# Patient Record
Sex: FEMALE | Race: WHITE | Employment: FULL TIME | URBAN - METROPOLITAN AREA
[De-identification: names, ages, dates, MRNs, and addresses within clinical notes are randomized per-mention and may not be internally consistent; named-entity substitution may affect disease eponyms.]

---

## 2018-07-06 ENCOUNTER — OFFICE VISIT (OUTPATIENT)
Dept: SURGERY | Age: 33
End: 2018-07-06

## 2018-07-06 VITALS
OXYGEN SATURATION: 99 % | HEART RATE: 80 BPM | DIASTOLIC BLOOD PRESSURE: 77 MMHG | BODY MASS INDEX: 40.46 KG/M2 | WEIGHT: 237 LBS | HEIGHT: 64 IN | RESPIRATION RATE: 16 BRPM | SYSTOLIC BLOOD PRESSURE: 136 MMHG | TEMPERATURE: 97.4 F

## 2018-07-06 DIAGNOSIS — E66.01 SEVERE OBESITY (BMI 35.0-39.9): ICD-10-CM

## 2018-07-06 DIAGNOSIS — E66.01 MORBID OBESITY (HCC): Primary | ICD-10-CM

## 2018-07-06 RX ORDER — OXYCODONE AND ACETAMINOPHEN 5; 325 MG/1; MG/1
TABLET ORAL
Status: ON HOLD | COMMUNITY
End: 2018-12-20

## 2018-07-06 RX ORDER — DULOXETIN HYDROCHLORIDE 60 MG/1
60 CAPSULE, DELAYED RELEASE ORAL DAILY
COMMUNITY
End: 2018-12-10 | Stop reason: ALTCHOICE

## 2018-07-06 RX ORDER — ALPRAZOLAM 2 MG/1
TABLET ORAL
COMMUNITY
End: 2018-12-10 | Stop reason: ALTCHOICE

## 2018-07-06 RX ORDER — HYDROXYZINE 25 MG/1
TABLET, FILM COATED ORAL
COMMUNITY
End: 2018-12-10 | Stop reason: ALTCHOICE

## 2018-07-06 RX ORDER — DEXTROAMPHETAMINE SACCHARATE, AMPHETAMINE ASPARTATE, DEXTROAMPHETAMINE SULFATE AND AMPHETAMINE SULFATE 7.5; 7.5; 7.5; 7.5 MG/1; MG/1; MG/1; MG/1
30 TABLET ORAL
COMMUNITY

## 2018-07-06 RX ORDER — HYDROCHLOROTHIAZIDE 12.5 MG/1
12.5 TABLET ORAL
COMMUNITY
End: 2018-12-21

## 2018-07-06 NOTE — PROGRESS NOTES
Bariatric Surgery Consultation    Subjective: The patient is a 27 y.o. obese female with a Body mass index is 40.68 kg/(m^2). Oricaitie Conrad The patient is not at her heaviest weight, she was at her heaviest about 7 months ago, for the past 3 years. she has been overweight since about 4 years ago. she has been considering surgery since 4 months ago. she desires surgery at this time because of multiple health concerns and their lifestyle issues which are hindered by their weight. she has been referred by his family physician Dr Caroline Villa for evaluation and treatment of their obesity via surgical intervention. Miryam Alexander has tried multiple diets in her lifetime most recently tried physician supervised    Bariatric comorbidities present are   Patient Active Problem List   Diagnosis Code    Severe obesity (BMI 35.0-39.9) ([facility] [facility] E66.01    GEORGE (obstructive sleep apnea) G47.33    Back pain M54.9    Edema extremities R60.0    Morbid obesity (HCC) E66.01    BMI 38.0-38.9,adult Z68.38    GERD (gastroesophageal reflux disease) K21.9    ADHD (attention deficit hyperactivity disorder) F90.9    Anxiety F41.9    Morbid obesity with BMI of 40.0-44.9, adult ([facility] [address] E66.01, Z68.41    Hypertension I10       The patient is considering laparoscopic sleeve gastrectomy for surgical weight loss due to their ineffective progress with medical forms of weight loss and the urging of their physician who cares for their primary medical issues. The patient  now presents  for consideration for weight loss surgery understanding the benefits of this over a medical approach of weight loss as was discussed in our presentation on weight loss surgery. They have discussed their plans both with their family and primary care physician who is in support of their pursuit of such. The patient has had health issues as of late and denies and gastrointestinal disturbances other than what is outlined below in their review of symptoms.  All of their prior evaluations available by both their PCP's and specialists physicians have been reviewed today either in the Care Everywhere portal or scanned under the media tab. I have spent a large portion of my initial consultation today reviewing the patients current dietary habits which have contributed to their health issues and obesity. I have suggested to them personally a dietary regimen that they can initiate now to help with their status as it pertains to their weight. They understand that the most important aspect of their journey through their weight loss endeavor will be their adherence to a new lifestyle of healthy eating behavior. They also understand that an adherence to an exercise program will not only help with weight loss but is ultimately important in weight maintenance. The patients goal weight is 170lb. These goals are consistent with expected outcomes of their desired operation. her Medical goals are resolution of these health issues. Patient Active Problem List    Diagnosis Date Noted    Severe obesity (BMI 35.0-39.9) ([facility] [address] 07/06/2018    GEORGE (obstructive sleep apnea)     Back pain     Edema extremities     Morbid obesity ([address]     BMI 38.0-38.9,adult     GERD (gastroesophageal reflux disease)     ADHD (attention deficit hyperactivity disorder)     Anxiety     Morbid obesity with BMI of 40.0-44.9, adult ([address]     Hypertension      Past Surgical History:   Procedure Laterality Date    HX WISDOM TEETH EXTRACTION        Social History   Substance Use Topics    Smoking status: Never Smoker    Smokeless tobacco: Never Used    Alcohol use No      Family History   Problem Relation Age of Onset    Stroke Mother     COPD Mother    [de-identified] Arthritis-rheumatoid Mother     Alcohol abuse Father     Depression Father     Drug Abuse Father     Diabetes Sister       Current Outpatient Prescriptions   Medication Sig Dispense Refill    ALPRAZolam (XANAX) 2 mg tablet Take  by mouth.       oxyCODONE-acetaminophen (PERCOCET) 5-325 mg per tablet Take  by mouth every four (4) hours as needed for Pain.  dextroamphetamine-amphetamine (ADDERALL) 30 mg tablet Take 30 mg by mouth.  hydroCHLOROthiazide (HYDRODIURIL) 12.5 mg tablet Take 12.5 mg by mouth daily.  DULoxetine (CYMBALTA) 60 mg capsule Take 60 mg by mouth daily.  hydrOXYzine HCl (ATARAX) 25 mg tablet Take  by mouth three (3) times daily as needed for Itching. No Known Allergies       Review of Systems:       General - No history or complaints of unexpected fever, chills, or weight loss  Head/Neck - No history or complaints of headache, diplopia, dysphagia, hearing loss  Cardiac - History of palpitations and shortness of breath. No history or complaints of chest pain, or murmur  Pulmonary - No history or complaints of shortness of breath, productive cough, hemoptysis  Gastrointestinal - History of acid reflux, no abdominal pain, obstipation/constipation or blood per rectum  Genitourinary - No history or complaints of hematuria/dysuria, stress urinary incontinence symptoms, or renal lithiasis  Musculoskeletal - History of joint pain in their back, right hip and right knee, with muscular weakness on her right side  Hematologic - No history or complaints of bleeding disorders, No blood transfusions  Neurologic - No history or complaints of  migraine headaches, seizure activity, syncopal episodes, TIA or stroke  Integumentary - No history or complaints of rashes, abnormal nevi, skin cancer  Gynecological - History of multiple still births.   No history of dysmenorrhea or cervical cancer               Objective:     Visit Vitals    /77 (BP 1 Location: Left arm, BP Patient Position: Sitting)    Pulse 80    Temp 97.4 °F (36.3 °C)    Resp 16    Ht 5' 4\" (1.626 m)    Wt 107.5 kg (237 lb)    SpO2 99%    BMI 40.68 kg/m2       Physical Examination: General appearance - alert, well appearing, and in no distress and oriented to person, place, and time  Mental status - alert, oriented to person, place, and time, normal mood, behavior, speech, dress, motor activity, and thought processes  Eyes - pupils equal and reactive, extraocular eye movements intact, sclera anicteric, left eye normal, right eye normal  Ears - right ear normal, left ear normal  Nose - normal and patent, no erythema, discharge or polyps  Mouth - mucous membranes moist, pharynx normal without lesions  Neck - supple, no significant adenopathy  Lymphatics - no palpable lymphadenopathy, no hepatosplenomegaly  Chest - clear to auscultation, no wheezes, rales or rhonchi, symmetric air entry  Heart - normal rate, regular rhythm, normal S1, S2, no murmurs, rubs, clicks or gallops  Abdomen - soft, nontender, nondistended, no masses or organomegaly  Back exam - full range of motion, no tenderness, palpable spasm or pain on motion  Neurological - alert, oriented, normal speech, no focal findings or movement disorder noted  Musculoskeletal - no joint tenderness, deformity or swelling  Extremities - peripheral pulses normal, no pedal edema, no clubbing or cyanosis  Skin - normal coloration and turgor, no rashes, no suspicious skin lesions noted    Labs:       No results found for this or any previous visit (from the past 1440 hour(s)). Assessment:     Morbid obesity with comorbidity    Plan:     laparoscopic sleeve gastrectomy    This is a 27 y.o. female with a BMI of Body mass index is 40.68 kg/(m^2). and the weight-related co-morbidties as noted below. Pillo Lovelace meets the NIH criteria for bariatric surgery based upon the BMI of Body mass index is 40.68 kg/(m^2). and multiple weight-related co-morbidties. Pillo Lovelace has elected laparoscopic sleeve gastrectomy as her intervention of choice for treatment of morbid obestiy through surgical means secondary to its safety profile, rapid return to work  and decreases in operative risks over gastric bypass.     In the office today, following Romina's history and physical examination, a 30 minute discussion regarding the anatomic alterations for the laparoscopic sleeve gastrectomy was undertaken. The dietary expectations and the patient and physician dependent factors for success were thoroughly discussed, to include the need for interval follow-up and long-term dietary changes associated with success. The possible complications of the sleeve gastrectomy  were also discussed, to include;death, DVT/PE, staple line leak, bleeding, stricture formation, infection, nutritional deficiencies and sleeve dilation. Specific weight related outcomes for success were also discussed with an emphasis on careful and close follow-up with the first year and eating behavior modification as the baseline and cyclical hunger return. The patient expressed an understanding of the above factors, and her questions were answered in their entirety. In addition, the patient attended a 1.5 hour power point seminar regarding obesity, surgical weight loss including, adjustable gastric band, gastric bypass, and sleeve gastrectomy. This discussion contrasted the different surgical techniques, mechanisms of actions and expected outcomes, and surgical and medical risks associated with each procedure. During this seminar, there was a long question and answer session where each questions was answered until there were no additional questions. Today, the patient had all of her questions answered and desires to proceed with  bariatric surgery initially choosing sleeve gastrectomy as her surgical option. Secondary Diagnoses:     Obstructive Sleep Apnea -The patient understands the association of sleep apnea and obesity and the additional risk that it caries related to post surgical complications. We will have the patient bring their CPAP machine to the hospital for use both postoperatively in the PACU and on the floor at its appropriate setting.  We will have them continue using it while at home after surgery and follow up with their pulmonologist 6 months after to be retested to see if it can be discontinued at that time period. Dietary Intervention  - The patient is currently scheduled to see or has been followed by a bariatric nutritionist for an attempt at preoperative weight loss as has been dictated by their insurance carrier. They will be assessed at various times during their follow up to evaluate their progress depending on the length of time that is required once again by their carrier. I have explained the importance of preoperative weight loss and the benefits regarding lower surgical risk and also assisting the patient in reaching their weight loss goal.  Finally they understand their is a physiologic benefit from the standpoint of hepatic volume reduction preoperatively. I have reiterated the importance of a low carbohydrate and high protein regimen to achieve their stated goal.    Hypertension - The patient has a clear understanding of how weight loss improves hypertension as a whole, but also they understand that there is a significant genetic component to this disease process. We will monitor the patients blood pressure while in the hospital and the plan would be to continue those medications postoperatively.  If a diuretic is being used we will stop them on discharge to prevent dehydration particularly with the sleeve gastrectomy and the gastric bypass procedures.  They will be instructed to monitor their blood pressure postoperatively while at home and notify their primary care physician in the event of any significantly high or uncharacteristic readings.     GERD -The patient understands that weight loss surgery is not a guaranteed cure for reflux disease but does understand the benefits that weight loss can have on reflux disease.  They also understand that at the time of surgery the gastroesophageal junction will be evaluated for the presence of a diaphragmatic hernia.  Hernias will be corrected always with the gastric band and sleeve gastrectomy procedures, but only on a case by case basis with the gastric bypass if it prevents our ability to perform the operation at hand, or if I feel that they would benefit long term with correction of this issue.  The patient also understands that neither weight loss surgery nor repair of a diaphragmatic hernia repair guarantees the complete cessation of the disease. They also understand there is a possibility of recurrence with a simple crural repair as is performed with these procedures. They understand they may have to continue their medications in the postoperative period. They have a good understanding that the gastric bypass procedure is better suited to total resolution of this issue and that neither the Lap Band nor sleeve gastrectomy is considered a curative procedure as it pertains to this diagnosis.             Signed By: Radha Armenta MD     July 6, 2018

## 2018-07-06 NOTE — MR AVS SNAPSHOT
Jose E Godoy 
 
 
 [address] [facility] 
[phone] Patient: Yaneth Hernandez MRN: [id number] :1985 Visit Information Date & Time Provider Department Dept. Phone Encounter #  
 2018 12:00 PM Elfida Buerger, [address] [facility] [phone] [id number] Follow-up Instructions Return in about 1 month (around 2018). Follow-up and Disposition History Your Appointments 2018 11:00 AM  
NUTRITION COUNSELING with TSS NUTRI VISIT PEN [facility] ([address]) Appt Note:   
 [address] [address] [facility]  
  
   
 [address] [place] [address] Upcoming Health Maintenance Date Due DTaP/Tdap/Td series (1 - Tdap) 2004 PAP AKA CERVICAL CYTOLOGY 2004 Influenza Age 5 to Adult 2018 Allergies as of 2018  Review Complete On: 2018 By: Elfida Buerger, MD  
 No Known Allergies Current Immunizations  Never Reviewed No immunizations on file. Not reviewed this visit You Were Diagnosed With   
  
 Codes Comments Morbid obesity ([facility] [address] [facility]    -  Primary ICD-10-CM: E66.01 
ICD-9-CM: 278.01 Severe obesity (BMI 35.0-39.9) ([facility])     ICD-10-CM: E66.01 
ICD-9-CM: 278.01 Vitals BP Pulse Temp Resp Height(growth percentile) Weight(growth percentile) 136/77 (BP 1 Location: Left arm, BP Patient Position: Sitting) 80 97.4 °F (36.3 °C) 16 5' 4\" (1.626 m) 237 lb (107.5 kg) SpO2 BMI Smoking Status 99% 40.68 kg/m2 Never Smoker Vitals History BMI and BSA Data Body Mass Index Body Surface Area  
 40.68 kg/m 2 2.2 m 2 Your Updated Medication List  
  
   
This list is accurate as of 18  1:15 PM.  Always use your most recent med list.  
  
  
  
  
 ADDERALL 30 mg tablet Generic drug:  dextroamphetamine-amphetamine Take 30 mg by mouth. DULoxetine 60 mg capsule Commonly known as:  CYMBALTA Take 60 mg by mouth daily. hydroCHLOROthiazide 12.5 mg tablet Commonly known as:  HYDRODIURIL Take 12.5 mg by mouth daily. hydrOXYzine HCl 25 mg tablet Commonly known as:  ATARAX Take  by mouth three (3) times daily as needed for Itching. PERCOCET 5-325 mg per tablet Generic drug:  oxyCODONE-acetaminophen Take  by mouth every four (4) hours as needed for Pain. XANAX 2 mg tablet Generic drug:  ALPRAZolam  
Take  by mouth. Follow-up Instructions Return in about 1 month (around 8/6/2018). Patient Instructions Body Mass Index: Care Instructions Your Care Instructions Body mass index (BMI) can help you see if your weight is raising your risk for health problems. It uses a formula to compare how much you weigh with how tall you are. · A BMI lower than 18.5 is considered underweight. · A BMI between 18.5 and 24.9 is considered healthy. · A BMI between 25 and 29.9 is considered overweight. A BMI of 30 or higher is considered obese. If your BMI is in the normal range, it means that you have a lower risk for weight-related health problems. If your BMI is in the overweight or obese range, you may be at increased risk for weight-related health problems, such as high blood pressure, heart disease, stroke, arthritis or joint pain, and diabetes. If your BMI is in the underweight range, you may be at increased risk for health problems such as fatigue, lower protection (immunity) against illness, muscle loss, bone loss, hair loss, and hormone problems. BMI is just one measure of your risk for weight-related health problems. You may be at higher risk for health problems if you are not active, you eat an unhealthy diet, or you drink too much alcohol or use tobacco products. Follow-up care is a key part of your treatment and safety. Be sure to make and go to all appointments, and call your doctor if you are having problems. It's also a good idea to know your test results and keep a list of the medicines you take. How can you care for yourself at home? · Practice healthy eating habits. This includes eating plenty of fruits, vegetables, whole grains, lean protein, and low-fat dairy. · If your doctor recommends it, get more exercise. Walking is a good choice. Bit by bit, increase the amount you walk every day. Try for at least 30 minutes on most days of the week. · Do not smoke. Smoking can increase your risk for health problems. If you need help quitting, talk to your doctor about stop-smoking programs and medicines. These can increase your chances of quitting for good. · Limit alcohol to 2 drinks a day for men and 1 drink a day for women. Too much alcohol can cause health problems. If you have a BMI higher than 25 · Your doctor may do other tests to check your risk for weight-related health problems. This may include measuring the distance around your waist. A waist measurement of more than 40 inches in men or 35 inches in women can increase the risk of weight-related health problems. · Talk with your doctor about steps you can take to stay healthy or improve your health. You may need to make lifestyle changes to lose weight and stay healthy, such as changing your diet and getting regular exercise. If you have a BMI lower than 18.5 · Your doctor may do other tests to check your risk for health problems. · Talk with your doctor about steps you can take to stay healthy or improve your health. You may need to make lifestyle changes to gain or maintain weight and stay healthy, such as getting more healthy foods in your diet and doing exercises to build muscle. Where can you learn more? Go to http://desire-homar.info/. Enter S176 in the search box to learn more about \"Body Mass Index: Care Instructions. \" Current as of: October 13, 2016 Content Version: 11.4 © 7893-3566 [employer]. Care instructions adapted under license by [employer] (which disclaims liability or warranty for this information). If you have questions about a medical condition or this instruction, always ask your healthcare professional. [facility] any warranty or liability for your use of this information. New patient Instructions 1. Ensure all pre-operative insurances requirements are complete (ie; dietary visits, psychology consults, primary care documentation, etc) 2. Adhere to pre-operative weight loss / weight maintenance plan discussed in the office today. 3. Contact the office with any questions on pre-operative clearance issues (ie; cardiology work-up, pulmonary work-up, upper GI study, etc). 4. If a barium upper GI study has been ordered for your evaluation, make sure you are on liquids only the morning of the procedure. Patient Instructions History Introducing [facility]! [employer] introduces [employer] patient portal. Now you can access parts of your medical record, email your doctor's office, and request medication refills online. 1. In your internet browser, go to [email or web address] 2. Click on the First Time User? Click Here link in the Sign In box. You will see the New Member Sign Up page. 3. Enter your [employer] Access Code exactly as it appears below. You will not need to use this code after youve completed the sign-up process. If you do not sign up before the expiration date, you must request a new code. · [employer] Access Code: [email or web address] Expires: 10/4/2018  1:15 PM 
 
4. Enter the last four digits of your Social Security Number (xxxx) and Date of Birth (mm/dd/yyyy) as indicated and click Submit.  You will be taken to the next sign-up page. 5. Create a [employer] ID. This will be your [employer] login ID and cannot be changed, so think of one that is secure and easy to remember. 6. Create a [employer] password. You can change your password at any time. 7. Enter your Password Reset Question and Answer. This can be used at a later time if you forget your password. 8. Enter your e-mail address. You will receive e-mail notification when new information is available in [address] 9. Click Sign Up. You can now view and download portions of your medical record. 10. Click the Download Summary menu link to download a portable copy of your medical information. If you have questions, please visit the Frequently Asked Questions section of the [employer] website. Remember, [employer] is NOT to be used for urgent needs. For medical emergencies, dial 911. Now available from your iPhone and Android! Please provide this summary of care documentation to your next provider. Your primary care clinician is listed as NONE. If you have any questions after today's visit, please call [phone].

## 2018-07-06 NOTE — PATIENT INSTRUCTIONS
Body Mass Index: Care Instructions  Your Care Instructions    Body mass index (BMI) can help you see if your weight is raising your risk for health problems. It uses a formula to compare how much you weigh with how tall you are. · A BMI lower than 18.5 is considered underweight. · A BMI between 18.5 and 24.9 is considered healthy. · A BMI between 25 and 29.9 is considered overweight. A BMI of 30 or higher is considered obese. If your BMI is in the normal range, it means that you have a lower risk for weight-related health problems. If your BMI is in the overweight or obese range, you may be at increased risk for weight-related health problems, such as high blood pressure, heart disease, stroke, arthritis or joint pain, and diabetes. If your BMI is in the underweight range, you may be at increased risk for health problems such as fatigue, lower protection (immunity) against illness, muscle loss, bone loss, hair loss, and hormone problems. BMI is just one measure of your risk for weight-related health problems. You may be at higher risk for health problems if you are not active, you eat an unhealthy diet, or you drink too much alcohol or use tobacco products. Follow-up care is a key part of your treatment and safety. Be sure to make and go to all appointments, and call your doctor if you are having problems. It's also a good idea to know your test results and keep a list of the medicines you take. How can you care for yourself at home? · Practice healthy eating habits. This includes eating plenty of fruits, vegetables, whole grains, lean protein, and low-fat dairy. · If your doctor recommends it, get more exercise. Walking is a good choice. Bit by bit, increase the amount you walk every day. Try for at least 30 minutes on most days of the week. · Do not smoke. Smoking can increase your risk for health problems. If you need help quitting, talk to your doctor about stop-smoking programs and medicines. These can increase your chances of quitting for good. · Limit alcohol to 2 drinks a day for men and 1 drink a day for women. Too much alcohol can cause health problems. If you have a BMI higher than 25  · Your doctor may do other tests to check your risk for weight-related health problems. This may include measuring the distance around your waist. A waist measurement of more than 40 inches in men or 35 inches in women can increase the risk of weight-related health problems. · Talk with your doctor about steps you can take to stay healthy or improve your health. You may need to make lifestyle changes to lose weight and stay healthy, such as changing your diet and getting regular exercise. If you have a BMI lower than 18.5  · Your doctor may do other tests to check your risk for health problems. · Talk with your doctor about steps you can take to stay healthy or improve your health. You may need to make lifestyle changes to gain or maintain weight and stay healthy, such as getting more healthy foods in your diet and doing exercises to build muscle. Where can you learn more? Go to http://desire-homar.info/. Enter S176 in the search box to learn more about \"Body Mass Index: Care Instructions. \"  Current as of: October 13, 2016  Content Version: 11.4  © 1366-9971 Health[employer], Inc[employer]. Care instructions adapted under license by [employer] (which disclaims liability or warranty for this information). If you have questions about a medical condition or this instruction, always ask your healthcare professional. [place] [address] any warranty or liability for your use of this information. New patient Instructions      1. Ensure all pre-operative insurances requirements are complete (ie; dietary visits, psychology consults, primary care documentation, etc)    2. Adhere to pre-operative weight loss / weight maintenance plan discussed in the office today.     3. Contact the office with any questions on pre-operative clearance issues (ie; cardiology work-up, pulmonary work-up, upper GI study, etc). 4. If a barium upper GI study has been ordered for your evaluation, make sure you are on liquids only the morning of the procedure.

## 2018-07-12 ENCOUNTER — CLINICAL SUPPORT (OUTPATIENT)
Dept: SURGERY | Age: 33
End: 2018-07-12

## 2018-07-12 VITALS — WEIGHT: 232 LBS | BODY MASS INDEX: 39.61 KG/M2 | HEIGHT: 64 IN

## 2018-07-12 DIAGNOSIS — E66.01 SEVERE OBESITY (BMI 35.0-39.9): Primary | ICD-10-CM

## 2018-07-12 NOTE — PROGRESS NOTES
Medical Weight Loss Multi-Disciplinary Program    Name: Ana Gonzalez   : 1985    Session# 1  Date: 2018     Height: 5' 4\" (162.6 cm)    Weight: 105.2 kg (232 lb) lbs. Body mass index is 39.82 kg/(m^2). Dietary Instructions    Reviewed intake  Understanding low carbohydrates, low sugar, higher protein meals  Understanding proper portions  Instruction given for personal dietary changes  Discussed perceived compliance  Comments: Pt given brief pre/post-op diet ed and diet hx reviewed. Physical Activity/Exercise    Discussed Perceived Compliance  Reasonable Goals Set  Motivation  Comments: Pt walks as tolerated. She has physician limitations due to severe back condition. Behavior Modification    Positive attitude  Comments: Pt is working on the following goals:    1. Continue to work to increase walking as tolerated and as physician recommended. 2. Eat 3 meals per day- can use a protein drink for one meal per day. 100 grams of carbohydrate guide. 3. Can continue intermittent fasting if would like. 4. Start taking a complete multivitamin daily. Candidate for surgery (per RD): pending    Dietitian: Dipti Armendariz, RD     Ana Gonzalez is a 27 y.o. female who present for a pre-op evaluation. Visit Vitals    Ht 5' 4\" (1.626 m)    Wt 105.2 kg (232 lb)    BMI 39.82 kg/m2     Past Medical History:   Diagnosis Date    ADHD (attention deficit hyperactivity disorder)     Anxiety     Back pain     Edema extremities     GERD (gastroesophageal reflux disease)     Hypertension     Morbid obesity ([address]     Morbid obesity with BMI of 40.0-44.9, adult ([address]     GEORGE (obstructive sleep apnea)            Procedure:  laparoscopic sleeve gastrectomy     Reasons for Surgery:  BMI > 35 with one or more medically significant comorbidities    Summary:  Pt given brief pre/post-op diet ed and diet hx reviewed.  Pt instructed to follow a low calorie, low carbohydrate, high protein diet of about 7706-4376 calories daily. Pt set several goals. See below. Current Vitamins: none    What have you done in the past to try to lose weight? Phentermine prescription, exercising lost 62 lbs, calorie and carb intake, decreased sodium, intermittent fasting      Why didn't you lose weight or keep the weight off?: back issues (spine is like a 67year old- they want to do fusion surgery but she does not want this)- cannot sit or stand for 15 minutes time periods      Patient Education and Materials Provided:  Supplement [facility], B Vitamin Information, MVI Recommendations, Calcium Citrate Information, Bariatric Supplement Companies, Protein Supplement Information, Fluid Requirements, No Caffeine or Carbonation, No Alcohol for One Year Post Op, 3 Balanced Meals a Day, Food Group Guide, Exercising and Addressed Current Habits / Changes to make    Nutritional Hx: What is the number of meals you eat per day? 2    Do you eat between meals / snack? yes  Typical snack: protein packs- P3 packs or make self     How fast do you eat your meals? rapid    How often do you eat fast food? Maybe once if that     How many sodas/sugared beverages do you drink per day? A regular [employer] [facility] can maybe once per week     How many caffeinated drinks do you have per day? [facility] [employer] [place]    How much milk and/or juice do you drink per day? Juice sometimes    How much water do you drink per day? 10+ (8oz glasses)    How often do you consume alcohol? never;     Diet History:  Breakfast  What are you eating and how much? Nutrigrain bar- almond nut regular    When? 10:30 am (because doing intermittent fasting)    Where? iii   Snacks  What are you eating and how much? i   When? ii   Where? iii   Hydration  What are you eating and how much? Water throughout the day    When? ii   Where? ii   Lunch  What are you eating and how much? P3- lunch    When? 12:45 PM   Where? iii   Snacks  What are you eating and how much? When?     Where? iii Hydration  What are you eating and how much? Water throughout the afternoon    When? ii   Where? iii   Dinner  What are you eating and how much? [employer]- chicken tenders, mashed potatoes and gravy    When? 7:15 pm   Where? iii   Snacks  What are you eating and how much? None last night- but may have lunch meat with cheese and a pickle or may have some ice cream- typically no snacking    When? ii   Where? iii   Hydration  What are you eating and how much? water   When? ii   Where? iii     Exercise:  Do you currently have an exercise routine? no, can walk as tolerated     Goals:   1. Continue to work to increase walking as tolerated and as physician recommended. 2. Eat 3 meals per day- can use a protein drink for one meal per day. 100 grams of carbohydrate guide. 3. Can continue intermittent fasting if would like. 4. Start taking a complete multivitamin daily.

## 2018-07-12 NOTE — PATIENT INSTRUCTIONS
Goals: 1. Continue to work to increase walking as tolerated and as physician recommended. 2. Eat 3 meals per day- can use a protein drink for one meal per day. 100 grams of carbohydrate guide. 3. Can continue intermittent fasting if would like. 4. Start taking a complete multivitamin daily.

## 2018-07-12 NOTE — MR AVS SNAPSHOT
[facility] 
 
 
 [facility] [place] [address] 
[phone] Patient: Theodore Sue MRN: [id number] :1985 Visit Information Date & Time Provider Department Dept. Phone Encounter #  
 2018  8:00 AM TSS [facility] [place] [phone] [id number] Your Appointments 2018  1:00 PM  
NUTRITION COUNSELING with TSS NUTR VISIT2 [employer] [facility] [place] [facility] Appt Note:   
 [id number] [facility] [place] [address]  
  
   
 [address] [place] [address] Upcoming Health Maintenance Date Due DTaP/Tdap/Td series (1 - Tdap) 2004 PAP AKA CERVICAL CYTOLOGY 2004 Influenza Age 5 to Adult 2018 Allergies as of 2018  Review Complete On: 2018 By: Anya Chung MD  
 No Known Allergies Current Immunizations  Never Reviewed No immunizations on file. Not reviewed this visit Vitals Height(growth percentile) Weight(growth percentile) BMI Smoking Status 5' 4\" (1.626 m) 232 lb (105.2 kg) 39.82 kg/m2 Never Smoker BMI and BSA Data Body Mass Index Body Surface Area  
 39.82 kg/m 2 2.18 m 2 Your Updated Medication List  
  
   
This list is accurate as of 18  8:23 AM.  Always use your most recent med list.  
  
  
  
  
 ADDERALL 30 mg tablet Generic drug:  dextroamphetamine-amphetamine Take 30 mg by mouth. DULoxetine 60 mg capsule Commonly known as:  CYMBALTA Take 60 mg by mouth daily. hydroCHLOROthiazide 12.5 mg tablet Commonly known as:  HYDRODIURIL Take 12.5 mg by mouth daily. hydrOXYzine HCl 25 mg tablet Commonly known as:  ATARAX Take  by mouth three (3) times daily as needed for Itching. PERCOCET 5-325 mg per tablet Generic drug:  oxyCODONE-acetaminophen Take  by mouth every four (4) hours as needed for Pain. XANAX 2 mg tablet Generic drug:  ALPRAZolam  
Take  by mouth. Patient Instructions Goals: 1. Continue to work to increase walking as tolerated and as physician recommended. 2. Eat 3 meals per day- can use a protein drink for one meal per day. 100 grams of carbohydrate guide. 3. Can continue intermittent fasting if would like. 4. Start taking a complete multivitamin daily. Introducing [facility] SERVICES! Ryanne Lund introduces [employer] patient portal. Now you can access parts of your medical record, email your doctor's office, and request medication refills online. 1. In your internet browser, go to [email or web address] 2. Click on the First Time User? Click Here link in the Sign In box. You will see the New Member Sign Up page. 3. Enter your [employer] Access Code exactly as it appears below. You will not need to use this code after youve completed the sign-up process. If you do not sign up before the expiration date, you must request a new code. · [employer] Access Code: [email or web address] Expires: 10/4/2018  1:15 PM 
 
4. Enter the last four digits of your Social Security Number (xxxx) and Date of Birth (mm/dd/yyyy) as indicated and click Submit. You will be taken to the next sign-up page. 5. Create a [employer] ID. This will be your [employer] login ID and cannot be changed, so think of one that is secure and easy to remember. 6. Create a [employer] password. You can change your password at any time. 7. Enter your Password Reset Question and Answer. This can be used at a later time if you forget your password. 8. Enter your e-mail address. You will receive e-mail notification when new information is available in [address] 9. Click Sign Up. You can now view and download portions of your medical record. 10. Click the Download Summary menu link to download a portable copy of your medical information. If you have questions, please visit the Frequently Asked Questions section of the [employer] website. Remember, [employer] is NOT to be used for urgent needs. For medical emergencies, dial 911. Now available from your iPhone and Android! Please provide this summary of care documentation to your next provider. Your primary care clinician is listed as NONE. If you have any questions after today's visit, please call [phone].

## 2018-08-08 ENCOUNTER — HOSPITAL ENCOUNTER (OUTPATIENT)
Age: 33
Setting detail: OUTPATIENT SURGERY
Discharge: HOME | End: 2018-08-08
Attending: SPECIALIST | Admitting: SPECIALIST
Payer: MEDICAID

## 2018-08-08 ENCOUNTER — CLINICAL SUPPORT (OUTPATIENT)
Dept: SURGERY | Age: 33
End: 2018-08-08

## 2018-08-08 ENCOUNTER — APPOINTMENT (OUTPATIENT)
Dept: GENERAL RADIOLOGY | Age: 33
End: 2018-08-08
Attending: SPECIALIST
Payer: MEDICAID

## 2018-08-08 VITALS
HEART RATE: 84 BPM | TEMPERATURE: 98.7 F | SYSTOLIC BLOOD PRESSURE: 126 MMHG | OXYGEN SATURATION: 97 % | RESPIRATION RATE: 14 BRPM | WEIGHT: 230.6 LBS | BODY MASS INDEX: 39.58 KG/M2 | DIASTOLIC BLOOD PRESSURE: 80 MMHG

## 2018-08-08 VITALS — WEIGHT: 232 LBS | BODY MASS INDEX: 39.61 KG/M2 | HEIGHT: 64 IN

## 2018-08-08 DIAGNOSIS — E66.01 MORBID OBESITY (HCC): ICD-10-CM

## 2018-08-08 DIAGNOSIS — E66.01 SEVERE OBESITY (BMI 35.0-39.9): Primary | ICD-10-CM

## 2018-08-08 PROCEDURE — 74240 X-RAY XM UPR GI TRC 1CNTRST: CPT

## 2018-08-08 PROCEDURE — 74011000255 HC RX REV CODE- 255: Performed by: SPECIALIST

## 2018-08-08 PROCEDURE — 76040000019: Performed by: SPECIALIST

## 2018-08-08 PROCEDURE — 77030032490 HC SLV COMPR SCD KNE COVD -B: Performed by: SPECIALIST

## 2018-08-08 NOTE — PATIENT INSTRUCTIONS
Goals: 1. For protein shake you can try: Isolate Zero, Isopure or you can purchase an unflavored protein powder and mix it with crystal light (recommend mixing it in a protein shaker bottle)  2. Continue current exercise routine of walking daily 2-3 times a day for 15-20 minutes   3.  Continue keeping carbohydrates to 100 grams or less per day

## 2018-08-08 NOTE — PROGRESS NOTES
Medical Weight Loss Multi-Disciplinary Program    Name: Shama Lopez   : 1985    Session# 2  Date: 2018     Height: 5' 4\" (162.6 cm)    Weight: 105.2 kg (232 lb) lbs. Body mass index is 39.82 kg/(m^2). Pounds Lost: 0 Pounds Gained: 0    Dietary Instructions    Reviewed intake  Instruction given for personal dietary changes  Discussed perceived compliance  Comments: reviewed patient's past monthly diet hx. Patient has been working on consistently eating three meals a day - tried nectar strawberry kiwi and did not like it - patient will try another brand of protein shake as a meal replacement or snack. Patient states she is trying to really make sure she's eating three meals a day - specifically breakfast.  Patient also has +4 piting edema currently. Physical Activity/Exercise    Reviewed Activity Log  Discussed Perceived Compliance  Reasonable Goals Set  Motivation  Comments: patient walks daily for 15 minutes at a time - walks her property 2-3 times a day for 15-20 minutes at a time. Behavior Modification    Reviewed behavior modification log  Identify obstacles to trigger change  Achieving/Rewarding goals met  Positive attitude  Discussed perceived compliance  Comments:     Goals:  1. For protein shake you can try: Isolate Zero, Isopure or you can purchase an unflavored protein powder and mix it with crystal light (recommend mixing it in a protein shaker bottle)  2. Continue current exercise routine of walking daily 2-3 times a day for 15-20 minutes   3.  Continue keeping carbohydrates to 100 grams or less per day     Candidate for surgery (per RD):pending   Dietitian: Janette Kwok

## 2018-08-08 NOTE — IP AVS SNAPSHOT
Summary of Care Report The Summary of Care report has been created to help improve care coordination. Users with access to [employer] or [address] (Web-based application) may access additional patient information including the Discharge Summary. If you are not currently a [facility] [address] user and need more information, please call the number listed below in the [place] [address] section and ask to be connected with Medical Records. Facility Information Name Address Phone [address] [address] [phone] Patient Information Patient Name Sex RADHIKA Underwood ([id number]) Female 1985 Discharge Information Admitting Provider Service Area Unit Vandana Bhatia MD /  [facility] Endoscopy / [phone] Discharge Provider Discharge Date/Time Discharge Disposition Destination (none) (none) (none) (none) Patient Language Language ENGLISH [13] Hospital Problems as of 2018  Reviewed: 2018 12:59 PM by Vandana Bhatia MD  
 None Non-Hospital Problems as of 2018  Reviewed: 2018 12:59 PM by Vandana Bhatia MD  
  
  
  
 Class Noted - Resolved Last Modified Active Problems Severe obesity (BMI 35.0-39.9) ([address]  2018 - Present 2018 by NOEMY Paredes Entered by NOEMY Paredes  
  GEORGE (obstructive sleep apnea)  Unknown - Present 2018 by NOEMY Paredes Entered by NOEMY Paredes Back pain  Unknown - Present 2018 by NOEMY Paredes Entered by NOEMY Paredes Edema extremities  Unknown - Present 2018 by NOEMY Paredes Entered by NOEMY Paredes Morbid obesity ([address]  Unknown - Present 2018 by NOEMY Paredes   Entered by NOEMY Paredes  
 BMI 38.0-38.9,adult  Unknown - Present 7/6/2018 by NOEMY Guerra Entered by NOEMY Guerra  
  GERD (gastroesophageal reflux disease)  Unknown - Present 7/6/2018 by NOEMY Guerra Entered by NOEMY Guerra  
  ADHD (attention deficit hyperactivity disorder)  Unknown - Present 7/6/2018 by NOEMY Guerra Entered by NOEMY Guerra Anxiety  Unknown - Present 7/6/2018 by NOEMY Guerra Entered by NOEMY Guerra Morbid obesity with BMI of 40.0-44.9, adult [facility])  Unknown - Present 7/6/2018 by Almedia Gosselin, MD  
  Entered by Almedia Gosselin, MD  
  Hypertension  Unknown - Present 7/6/2018 by Almedia Gosselin, MD  
  Entered by Almedia Gosselin, MD  
  
You are allergic to the following No active allergies Current Discharge Medication List  
  
ASK your doctor about these medications Dose & Instructions Dispensing Information Comments ADDERALL 30 mg tablet Generic drug:  dextroamphetamine-amphetamine Dose:  30 mg Take 30 mg by mouth. Refills:  0 DULoxetine 60 mg capsule Commonly known as:  CYMBALTA Dose:  60 mg Take 60 mg by mouth daily. Refills:  0  
   
 hydroCHLOROthiazide 12.5 mg tablet Commonly known as:  HYDRODIURIL Dose:  12.5 mg Take 12.5 mg by mouth daily. Refills:  0  
   
 hydrOXYzine HCl 25 mg tablet Commonly known as:  ATARAX Take  by mouth three (3) times daily as needed for Itching. Refills:  0 PERCOCET 5-325 mg per tablet Generic drug:  oxyCODONE-acetaminophen Take  by mouth every four (4) hours as needed for Pain. Refills:  0  
   
 XANAX 2 mg tablet Generic drug:  ALPRAZolam  
 Take  by mouth. Refills:  0 Surgery Information ID Date/Time Status Primary Surgeon All Procedures Location [id number] 8/8/2018 1445 Unposted Almedia Gosselin, MD UGI ONLY WILL DO IN FILL CLINIC  [facility] ENDOSCOPY Follow-up Information None Discharge Instructions None Chart Review Routing History No Routing History on File

## 2018-08-08 NOTE — PROCEDURES
Patricia Ortega   : 1985  Medical Record [id number]            PREPROCEDURE DIAGNOSIS: This patient is preoperative for laparoscopic sleeve gastrectomyprocedure with a history of  reflux disease. POSTPROCEDURE DIAGNOSIS: This patient is preoperative for laparoscopic sleeve gastrectomyprocedure with a history of  reflux disease. PROCEDURES PERFORMED: Upper GI study with barium. ESTIMATED BLOOD LOSS: None. SPECIMENS: None. STATEMENT OF MEDICAL NECESSITY: The patient is a patient with a  longstanding history of obesity. They are now considering the laparoscopic sleeve gastrectomyprocedure as a means of surgical weight control and due to their history of reflux disease and are being assessed preoperatively for such. DESCRIPTION OF PROCEDURE: The patient was brought to the fluoroscopy unit and  was given thin barium. On swallowing of barium, they were noted to have  normal peristalsis of their esophagus. They had prompt filling of distal  esophagus with tapering into the gastroesophageal junction. There was no evidence of a hiatal hernia present. Contrast then filled the gastric cardia, fundus,body and pre pyloric region with no abnormalities noted. Contrast then exited the pylorus in normal fashion. No obstruction was noted. There was no evidence of reflux noted.     (normal anatomy)    Ralf Solomon MD

## 2018-08-08 NOTE — IP AVS SNAPSHOT
303 [facility] 
 
 
 [address] [address] 
[phone] Patient: Miryam Alexander MRN: [id number] :1985 About your hospitalization You were admitted on:  2018 You last received care in the:  THE [facility] ENDOSCOPY You were discharged on:  2018 Why you were hospitalized Your primary diagnosis was:  Not on File Follow-up Information None Your Scheduled Appointments 2018 ENDOSCOPY with Isabel Guzman MD  
THE [facility] ENDOSCOPY [facility]) [address] [address]  
[phone]   8:00 AM EDT NUTRITION COUNSELING with TSS NUTRI VISIT PEN [facility] [place] Golden ([facility])  
 [address] [place] [address]  
[phone] 2018  7:30 AM EDT NUTRITION COUNSELING with TSS NUTRI VISIT PEN [facility] Golden ([facility])  
 [address] [place] [address]  
[phone] Discharge Orders None A check lianet indicates which time of day the medication should be taken. My Medications ASK your doctor about these medications Instructions Each Dose to Equal  
 Morning Noon Evening Bedtime ADDERALL 30 mg tablet Generic drug:  dextroamphetamine-amphetamine Your last dose was: Your next dose is: Take 30 mg by mouth. 30 mg DULoxetine 60 mg capsule Commonly known as:  CYMBALTA Your last dose was: Your next dose is: Take 60 mg by mouth daily. 60 mg  
    
   
   
   
  
 hydroCHLOROthiazide 12.5 mg tablet Commonly known as:  HYDRODIURIL Your last dose was: Your next dose is: Take 12.5 mg by mouth daily.   
 12.5 mg  
    
   
   
   
  
 hydrOXYzine HCl 25 mg tablet Commonly known as:  ATARAX Your last dose was: Your next dose is: Take  by mouth three (3) times daily as needed for Itching. PERCOCET 5-325 mg per tablet Generic drug:  oxyCODONE-acetaminophen Your last dose was: Your next dose is: Take  by mouth every four (4) hours as needed for Pain. XANAX 2 mg tablet Generic drug:  ALPRAZolam  
   
Your last dose was: Your next dose is: Take  by mouth. Opioid Education Prescription Opioids: What You Need to Know: 
 
Prescription opioids can be used to help relieve moderate-to-severe pain and are often prescribed following a surgery or injury, or for certain health conditions. These medications can be an important part of treatment but also come with serious risks. Opioids are strong pain medicines. Examples include hydrocodone, oxycodone, fentanyl, and morphine. Heroin is an example of an illegal opioid. It is important to work with your health care provider to make sure you are getting the safest, most effective care. WHAT ARE THE RISKS AND SIDE EFFECTS OF OPIOID USE? Prescription opioids carry serious risks of addiction and overdose, especially with prolonged use. An opioid overdose, often marked by slow breathing, can cause sudden death. The use of prescription opioids can have a number of side effects as well, even when taken as directed. · Tolerance-meaning you might need to take more of a medication for the same pain relief · Physical dependence-meaning you have symptoms of withdrawal when the medication is stopped. Withdrawal symptoms can include nausea, sweating, chills, diarrhea, stomach cramps, and muscle aches. Withdrawal can last up to several weeks, depending on which drug you took and how long you took it. · Increased sensitivity to pain · Constipation · Nausea, vomiting, and dry mouth · Sleepiness and dizziness · Confusion · Depression · Low levels of testosterone that can result in lower sex drive, energy, and strength · Itching and sweating RISKS ARE GREATER WITH:      
· History of drug misuse, substance use disorder, or overdose · Mental health conditions (such as depression or anxiety) · Sleep apnea · Older age (72 years or older) · Pregnancy Avoid alcohol while taking prescription opioids. Also, unless specifically advised by your health care provider, medications to avoid include: · Benzodiazepines (such as Xanax or Valium) · Muscle relaxants (such as Soma or Flexeril) · Hypnotics (such as Ambien or Lunesta) · Other prescription opioids KNOW YOUR OPTIONS Talk to your health care provider about ways to manage your pain that don't involve prescription opioids. Some of these options may actually work better and have fewer risks and side effects. Options may include: 
· Pain relievers such as acetaminophen, ibuprofen, and naproxen · Some medications that are also used for depression or seizures · Physical therapy and exercise · Counseling to help patients learn how to cope better with triggers of pain and stress. · Application of heat or cold compress · Massage therapy · Relaxation techniques Be Informed Make sure you know the name of your medication, how much and how often to take it, and its potential risks & side effects. IF YOU ARE PRESCRIBED OPIOIDS FOR PAIN: 
· Never take opioids in greater amounts or more often than prescribed. Remember the goal is not to be pain-free but to manage your pain at a tolerable level. · Follow up with your primary care provider to: · Work together to create a plan on how to manage your pain. · Talk about ways to help manage your pain that don't involve prescription opioids. · Talk about any and all concerns and side effects. · Help prevent misuse and abuse. · Never sell or share prescription opioids · Help prevent misuse and abuse. · Store prescription opioids in a secure place and out of reach of others (this may include visitors, children, friends, and family). · Safely dispose of unused/unwanted prescription opioids: Find your community drug take-back program or your pharmacy mail-back program, or flush them down the toilet, following guidance from the Food and Drug Administration (www.fda.gov/Drugs/ResourcesForYou). · Visit www.cdc.gov/drugoverdose to learn about the risks of opioid abuse and overdose. · If you believe you may be struggling with addiction, tell your health care provider and ask for guidance or call Superhuman at 9-948-152-HUDE. Discharge Instructions None Introducing [facility]! [employer] introduces [employer] patient portal. Now you can access parts of your medical record, email your doctor's office, and request medication refills online. 1. In your internet browser, go to [email or web address] [facility]/Aiming 2. Click on the First Time User? Click Here link in the Sign In box. You will see the New Member Sign Up page. 3. Enter your [employer] Access Code exactly as it appears below. You will not need to use this code after youve completed the sign-up process. If you do not sign up before the expiration date, you must request a new code. · [employer] Access Code: [email or web address] Expires: 10/4/2018  1:15 PM 
 
4. Enter the last four digits of your Social Security Number (xxxx) and Date of Birth (mm/dd/yyyy) as indicated and click Submit. You will be taken to the next sign-up page. 5. Create a B[employer] App[employer] ID. This will be your [employer] login ID and cannot be changed, so think of one that is secure and easy to remember. 6. Create a B[employer] password. You can change your password at any time. 7. Enter your Password Reset Question and Answer. This can be used at a later time if you forget your password. 8. Enter your e-mail address. You will receive e-mail notification when new information is available in [address] 9. Click Sign Up. You can now view and download portions of your medical record. 10. Click the Download Summary menu link to download a portable copy of your medical information. If you have questions, please visit the Frequently Asked Questions section of the [employer] website. Remember, [employer] is NOT to be used for urgent needs. For medical emergencies, dial 911. Now available from your iPhone and Android! Introducing Brian Cox As a Areli Garcia patient, I wanted to make you aware of our electronic visit tool called Brian Kenny. Areli Garcia 24/7 allows you to connect within minutes with a medical provider 24 hours a day, seven days a week via a mobile device or tablet or logging into a secure website from your computer. You can access Brian Cox from anywhere in the [place]. A virtual visit might be right for you when you have a simple condition and feel like you just dont want to get out of bed, or cant get away from work for an appointment, when your regular Areli Garcia provider is not available (evenings, weekends or holidays), or when youre out of town and need minor care. Electronic visits cost only $49 and if the Areli Garcia 24/7 provider determines a prescription is needed to treat your condition, one can be electronically transmitted to a nearby pharmacy*. Please take a moment to enroll today if you have not already done so. The enrollment process is free and takes just a few minutes. To enroll, please download the [employer] 24/[employer] cindi to your tablet or phone, or visit [email or web address] to enroll on your computer.    
And, as an [address] [place] [address] patient with a [employer] account, the results of your visits will be scanned into your electronic medical record and your primary care provider will be able to view the scanned results. We urge you to continue to see your regular [facility] provider for your ongoing medical care. And while your primary care provider may not be the one available when you seek a Brian Cox virtual visit, the peace of mind you get from getting a real diagnosis real time can be priceless. For more information on Brian Castellonfin, view our Frequently Asked Questions (FAQs) at [email or web address]. Sincerely, 
 
Mich Bowen MD 
Chief Medical Officer [address] *:  certain medications cannot be prescribed via [email or web address] Unresulted Labs-Please follow up with your PCP about these lab tests Order Current Status XR GASTROGRAFFIN UPPER GI In process Providers Seen During Your Hospitalization Provider Specialty Primary office phone Anya Chung MD General Surgery [phone] Your Primary Care Physician (PCP) Primary Care Physician Office Phone Office Fax NONE ** None ** ** None ** You are allergic to the following No active allergies Recent Documentation Weight BMI Smoking Status 104.6 kg 39.58 kg/m2 Never Smoker Emergency Contacts Name Discharge Info Relation Home Work Mobile Azeb López CAREGIVER [3] Girlfriend [18]   [phone] Patient Belongings The following personal items are in your possession at time of discharge: 
                             
 
  
  
 Please provide this summary of care documentation to your next provider. Signatures-by signing, you are acknowledging that this After Visit Summary has been reviewed with you and you have received a copy. Patient Signature:  ____________________________________________________________ Date:  ____________________________________________________________  
  
Geno Moulds Provider Signature:  ____________________________________________________________ Date:  ____________________________________________________________

## 2018-09-06 ENCOUNTER — CLINICAL SUPPORT (OUTPATIENT)
Dept: SURGERY | Age: 33
End: 2018-09-06

## 2018-09-06 VITALS — WEIGHT: 227 LBS | HEIGHT: 64 IN | BODY MASS INDEX: 38.76 KG/M2

## 2018-09-06 DIAGNOSIS — E66.01 MORBID OBESITY WITH BMI OF 40.0-44.9, ADULT (HCC): Primary | ICD-10-CM

## 2018-09-06 NOTE — PATIENT INSTRUCTIONS
Goals: 1. Try Gene pro unflavored- from [facility] only- go in beverages or in soup. 2. Continue to work to eat 3 meals per day. 3. Continue to increase steps as able.

## 2018-09-06 NOTE — MR AVS SNAPSHOT
303 [facility] 
 
 
 [address] [facility] [place] [address] [facility] 
[phone] Patient: Miryam Alexander MRN: [id number] :1985 Visit Information Date & Time Provider Department Dept. Phone Encounter #  
 2018  8:00 AM TSS NUTRI VISIT PEN [facility] [place] [phone] [id number] Your Appointments 10/3/2018  7:30 AM  
NUTRITION COUNSELING with TSS NUTRI VISIT PEN [facility] [place] ([facility]) Appt Note: 4 of 6  
 [address] [facility] [address] [facility] [address]  
[phone]  
  
   
 [address] [place] [address]  
  
    
 2018  9:30 AM  
NUTRITION COUNSELING with TSS NUTRI VISIT PEN [facility] [place] ([facility]) Appt Note: 5  [address] [place] [address] [address] [address] [place] [address]  
[phone]  
  
    
 12/3/2018  7:30 AM  
NUTRITION COUNSELING with TSS NUTRI VISIT PEN [facility] [place] ([address] [place] [address]) Appt Note: 6 of 6  
 [address] [facility] [address] [address] [facility]  
[phone] Upcoming Health Maintenance Date Due DTaP/Tdap/Td series (1 - Tdap) 2004 PAP AKA CERVICAL CYTOLOGY 2004 Influenza Age 5 to Adult 2018 Allergies as of 2018  Review Complete On: 2018 By: Isabel Guzman MD  
 No Known Allergies Current Immunizations  Never Reviewed No immunizations on file. Not reviewed this visit Vitals Height(growth percentile) Weight(growth percentile) BMI Smoking Status 5' 4\" (1.626 m) 227 lb (103 kg) 38.96 kg/m2 Never Smoker BMI and BSA Data Body Mass Index Body Surface Area  
 38.96 kg/m 2 2.16 m 2 Your Updated Medication List  
  
   
This list is accurate as of 18  8:26 AM.  Always use your most recent med list.  
  
  
  
  
 ADDERALL 30 mg tablet Generic drug:  dextroamphetamine-amphetamine Take 30 mg by mouth. DULoxetine 60 mg capsule Commonly known as:  CYMBALTA Take 60 mg by mouth daily. hydroCHLOROthiazide 12.5 mg tablet Commonly known as:  HYDRODIURIL Take 12.5 mg by mouth daily. hydrOXYzine HCl 25 mg tablet Commonly known as:  ATARAX Take  by mouth three (3) times daily as needed for Itching. PERCOCET 5-325 mg per tablet Generic drug:  oxyCODONE-acetaminophen Take  by mouth every four (4) hours as needed for Pain. XANAX 2 mg tablet Generic drug:  ALPRAZolam  
Take  by mouth. Patient Instructions Goals: 1. Try Gene pro unflavored- from [facility] only- go in beverages or in soup. 2. Continue to work to eat 3 meals per day. 3. Continue to increase steps as able. Introducing [facility]! [employer] introduces Useful at Night patient portal. Now you can access parts of your medical record, email your doctor's office, and request medication refills online. 1. In your internet browser, go to [email or web address] 2. Click on the First Time User? Click Here link in the Sign In box. You will see the New Member Sign Up page. 3. Enter your Use[employer] Access Code exactly as it appears below. You will not need to use this code after youve completed the sign-up process. If you do not sign up before the expiration date, you must request a new code. · [employer] Access Code: [email or web address] Expires: 10/4/2018  1:15 PM 
 
4. Enter the last four digits of your Social Security Number (xxxx) and Date of Birth (mm/dd/yyyy) as indicated and click Submit. You will be taken to the next sign-up page. 5. Create a [employer] ID. This will be your Use[employer] login ID and cannot be changed, so think of one that is secure and easy to remember. 6. Create a Use[employer] password. You can change your password at any time. 7. Enter your Password Reset Question and Answer.  This can be used at a later time if you forget your password. 8. Enter your e-mail address. You will receive e-mail notification when new information is available in [address] 9. Click Sign Up. You can now view and download portions of your medical record. 10. Click the Download Summary menu link to download a portable copy of your medical information. If you have questions, please visit the Frequently Asked Questions section of the [employer] website. Remember, [employer] is NOT to be used for urgent needs. For medical emergencies, dial 911. Now available from your iPhone and Android! Please provide this summary of care documentation to your next provider. Your primary care clinician is listed as NONE. If you have any questions after today's visit, please call [phone].

## 2018-09-06 NOTE — PROGRESS NOTES
Medical Weight Loss Multi-Disciplinary Program    Name: Andria Mclaughlin   : 1985    Session# 3  Date: 2018     Height: 5' 4\" (162.6 cm)    Weight: 103 kg (227 lb) lbs. Body mass index is 38.96 kg/(m^2). Pounds Lost: 5   Dietary Instructions    Reviewed intake  Understanding low carbohydrates, low sugar, higher protein meals  Understanding proper portions  Instruction given for personal dietary changes  Discussed perceived compliance  Comments: Diet hx reviewed and personal dietary changes discussed. Diet hx: B- 2 egg whites, ham, water, L-2 crab cakes, 4 crab fritters, strawberries, grapes, cucumber slices, water, D- Protein bar- [employer] brand at 5:30 pm   Water throughout the day     Physical Activity/Exercise    Discussed Perceived Compliance  Reasonable Goals Set  Motivation  Comments: Walking at work 7 days per week. Increase steps as able. She is limited due to back problems. Behavior Modification    Achieving/Rewarding goals met  Positive attitude  Discussed perceived compliance  Comments: Pt has increased her walking and plans to increase as able. Pt has continued intermittent fasting and finds this works well for her. She tried Nectar protein and does not like this- she has not tried any of the other recommended protein supplements yet. She has been working to not skip meals. Her focus is on the following this month:      Goals:  1. Try Gene pro unflavored- from [facility] only- go in beverages or in soup. 2. Continue to work to eat 3 meals per day. 3. Continue to increase steps as able.      Candidate for surgery (per RD):  Pending    Dietitian: Sebastian Rosas RD

## 2018-10-04 ENCOUNTER — CLINICAL SUPPORT (OUTPATIENT)
Dept: SURGERY | Age: 33
End: 2018-10-04

## 2018-10-04 VITALS — WEIGHT: 222 LBS | BODY MASS INDEX: 37.9 KG/M2 | HEIGHT: 64 IN

## 2018-10-04 DIAGNOSIS — E66.01 MORBID OBESITY WITH BMI OF 40.0-44.9, ADULT (HCC): Primary | ICD-10-CM

## 2018-10-04 NOTE — MR AVS SNAPSHOT
303 [facility] 
 
 
 [address] [facility] [address] [address] [facility] 
[phone] Patient: Manish Whalen MRN: [id number] :1985 Visit Information Date & Time Provider Department Dept. Phone Encounter #  
 10/4/2018  7:45 AM TSS 12[address] [facility] [place] [phone] [id number] Your Appointments 2018  9:30 AM  
NUTRITION COUNSELING with TSS NUTRI VISIT PEN [facility] [place] ([address]) Appt Note: 0 [facility] [address] [place] [address]  
[phone]  
  
   
 [address] [place] [address]  
  
    
 12/3/2018  7:30 AM  
NUTRITION COUNSELING with TSS NUTRI VISIT PEN [facility] [place] ([address]) Appt Note:   
 [address] [facility] [address] [address] [facility]  
[phone] Upcoming Health Maintenance Date Due DTaP/Tdap/Td series (1 - Tdap) 2004 PAP AKA CERVICAL CYTOLOGY 2004 Influenza Age 5 to Adult 2018 Allergies as of 10/4/2018  Review Complete On: 2018 By: Marielle hCris MD  
 No Known Allergies Current Immunizations  Never Reviewed No immunizations on file. Not reviewed this visit Vitals Height(growth percentile) Weight(growth percentile) BMI Smoking Status 5' 4\" (1.626 m) 222 lb (100.7 kg) 38.11 kg/m2 Never Smoker BMI and BSA Data Body Mass Index Body Surface Area  
 38.11 kg/m 2 2.13 m 2 Your Updated Medication List  
  
   
This list is accurate as of 10/4/18  8:36 AM.  Always use your most recent med list.  
  
  
  
  
 ADDERALL 30 mg tablet Generic drug:  dextroamphetamine-amphetamine Take 30 mg by mouth. DULoxetine 60 mg capsule Commonly known as:  CYMBALTA Take 60 mg by mouth daily. hydroCHLOROthiazide 12.5 mg tablet Commonly known as:  HYDRODIURIL  
 Take 12.5 mg by mouth daily. hydrOXYzine HCl 25 mg tablet Commonly known as:  ATARAX Take  by mouth three (3) times daily as needed for Itching. PERCOCET 5-325 mg per tablet Generic drug:  oxyCODONE-acetaminophen Take  by mouth every four (4) hours as needed for Pain. XANAX 2 mg tablet Generic drug:  ALPRAZolam  
Take  by mouth. Patient Instructions Goals: 1. Continue to work to increase exercise- go dancing twice per week- increase as able with back issues. 2. Continue to work to eat 3 meals per day, everyday. 3. Work to have protein at breakfast- can be shake or eggs or other protein source- instead of cereal.  
4. Continue to use gene pro in strawberry kiwi crystal light- snack. Introducing [facility]! [employer] introduces [employer] patient portal. Now you can access parts of your medical record, email your doctor's office, and request medication refills online. 1. In your internet browser, go to [email or web address] 2. Click on the First Time User? Click Here link in the Sign In box. You will see the New Member Sign Up page. 3. Enter your [employer] Access Code exactly as it appears below. You will not need to use this code after youve completed the sign-up process. If you do not sign up before the expiration date, you must request a new code. · [employer] Access Code: [email or web address] Expires: 10/4/2018  1:15 PM 
 
4. Enter the last four digits of your Social Security Number (xxxx) and Date of Birth (mm/dd/yyyy) as indicated and click Submit. You will be taken to the next sign-up page. 5. Create a [employer] ID. This will be your [employer] login ID and cannot be changed, so think of one that is secure and easy to remember. 6. Create a [employer] password. You can change your password at any time. 7. Enter your Password Reset Question and Answer. This can be used at a later time if you forget your password. 8. Enter your e-mail address. You will receive e-mail notification when new information is available in [address] 9. Click Sign Up. You can now view and download portions of your medical record. 10. Click the Download Summary menu link to download a portable copy of your medical information. If you have questions, please visit the Frequently Asked Questions section of the [employer] website. Remember, [employer] is NOT to be used for urgent needs. For medical emergencies, dial 911. Now available from your iPhone and Android! Please provide this summary of care documentation to your next provider. Your primary care clinician is listed as NONE. If you have any questions after today's visit, please call [phone].

## 2018-10-04 NOTE — PROGRESS NOTES
Medical Weight Loss Multi-Disciplinary Program    Name: Jose Carlos Hill   : 1985    Session# 4  Date: 10/4/2018     Height: 5' 4\" (162.6 cm)    Weight: 100.7 kg (222 lb) lbs. Body mass index is 38.11 kg/(m^2). Pounds Lost: 5     Dietary Instructions    Reviewed intake  Understanding low carbohydrates, low sugar, higher protein meals  Understanding proper portions  Instruction given for personal dietary changes  Discussed perceived compliance  Comments: Diet hx reviewed and personal dietary changes discussed. Diet hx: B-raisin brain with milk, L-ham sandwich, pickles, water, water between meals, S-P3 with turkey, D- steak and cheese quesadilas, water, and voodo chips- kettle cooked chip, S-strawberries and banana     Physical Activity/Exercise    Discussed Perceived Compliance  Reasonable Goals Set  Motivation  Comments: Added dancing once per week for a few hours. Increasing walking at work as able. Goal to continue to work to increase exercise- go dancing twice per week- increase as able with back issues. Behavior Modification    Achieving/Rewarding goals met  Positive attitude  Discussed perceived compliance  Comments: Pt is exercising and plans to increase. She has found that she likes the [facility] gene pro unflavored protein supplement and can use this to meet her needs after surgery. She has done well eating 3 meals per day and not skipping. Her focus is on the following this month:     Goals:  1. Continue to work to increase exercise- go dancing twice per week- increase as able with back issues. 2. Continue to work to eat 3 meals per day, everyday. 3. Work to have protein at breakfast- can be shake or eggs or other protein source- instead of cereal.   4. Continue to use gene pro in strawberry kiwi crystal light- snack.      Candidate for surgery (per RD): Pending    Dietitian: Kurt Peraza RD

## 2018-10-04 NOTE — PATIENT INSTRUCTIONS
Goals: 1. Continue to work to increase exercise- go dancing twice per week- increase as able with back issues. 2. Continue to work to eat 3 meals per day, everyday. 3. Work to have protein at breakfast- can be shake or eggs or other protein source- instead of cereal.   4. Continue to use gene pro in strawberry kiwi crystal light- snack.

## 2018-11-01 ENCOUNTER — CLINICAL SUPPORT (OUTPATIENT)
Dept: SURGERY | Age: 33
End: 2018-11-01

## 2018-11-01 DIAGNOSIS — E66.01 SEVERE OBESITY WITH BODY MASS INDEX (BMI) OF 35.0 TO 39.9 WITH SERIOUS COMORBIDITY (HCC): Primary | ICD-10-CM

## 2018-11-01 NOTE — PROGRESS NOTES
Medical Weight Loss Multi-Disciplinary Program    Name: Shira Scott   : 1985    Session# 5  Date: 2018     No weight taken today as this was a phone consult. Pt came by the office to weigh on 2018    Height: 5' 4\" (162.6 cm)    Weight: 98.9 kg (218 lb) lbs. Body mass index is 37.42 kg/m². Pounds Lost: 4     Dietary Instructions    Reviewed intake  Understanding low carbohydrates, low sugar, higher protein meals  Understanding proper portions  Instruction given for personal dietary changes  Discussed perceived compliance  Comments: Diet hx reviewed and personal dietary changes discussed. Diet hx: B-2 egg whites, sausage links, water, L- almonds and cheese blocks, D-spaghetti with sausage cut up in this, S- Nachos and cheese- tortilla chips with cheese water    Hydration: Well over 64 oz of water a day    Physical Activity/Exercise    Discussed Perceived Compliance  Reasonable Goals Set  Motivation  Comments: Dancing 3-4 times per week at least 90 minutes of activity during this. Continued increased activity at work- walking the property. Goal to continue current exercise routine. Behavior Modification    Reviewed behavior modification log  Achieving/Rewarding goals met  Positive attitude  Discussed perceived compliance  Comments: Pt is exercising and plans to continue. Doing well with protein at breakfast. She has improved her eating frequency, eating 3 meals per day most days. She has continued to use Gene pro unflavored protein in beverages as a snack. Her focus is on the following this month:     Goals:  1. Switch to Vitamin Water Zero from regular. 2. Switch to turkey or chicken based breakfast meats, Maltese weiss is lean as well. 3. Continue to work to eat 3 meals per day. 4. Continue low carbohydrate/higher protein meals. 5. Continue multivitamin daily.      Candidate for surgery (per RD): pending    Dietitian: Akiko Wong RD

## 2018-11-05 VITALS — BODY MASS INDEX: 37.22 KG/M2 | WEIGHT: 218 LBS | HEIGHT: 64 IN

## 2018-12-03 ENCOUNTER — CLINICAL SUPPORT (OUTPATIENT)
Dept: SURGERY | Age: 33
End: 2018-12-03

## 2018-12-03 VITALS — HEIGHT: 64 IN | WEIGHT: 214 LBS | BODY MASS INDEX: 36.54 KG/M2

## 2018-12-03 DIAGNOSIS — E66.01 SEVERE OBESITY WITH BODY MASS INDEX (BMI) OF 35.0 TO 39.9 WITH SERIOUS COMORBIDITY (HCC): Primary | ICD-10-CM

## 2018-12-03 NOTE — PROGRESS NOTES
Medical Weight Loss Multi-Disciplinary Program    Name: Santa Pelaez   : 1985    Session# 6  Date: 12/3/2018     Height: 5' 4\" (162.6 cm)    Weight: 97.1 kg (214 lb) lbs. Body mass index is 36.73 kg/m². Pounds Lost: 4     Dietary Instructions    Reviewed intake  Instruction given for personal dietary changes  Discussed perceived compliance  Comments: reviewed patient's past monthly diet hx. Patient has been working on consistently eating three meals a day - using a protein shake (unflavored protein powder) and using P3's as snacks. Patient is also taking her daily MVI and has been working on 1200 calories a day and working on decreasing her carbohydrates, has also switched to more chicken and pork (not eating much beef). Physical Activity/Exercise    Reviewed Activity Log  Discussed Perceived Compliance  Reasonable Goals Set  Motivation  Comments: patient has been dancing 4 nights a week for 60 minutes and walks non-stop at work, has a meeting with a  today at 5 pm.     Behavior Modification    Reviewed behavior modification log  Identify obstacles to trigger change  Achieving/Rewarding goals met  Positive attitude  Discussed perceived compliance  Comments:     Patient is doing well overall with her diet changes and exercise patient has switched to turkey and chicken, not eating red meat anymore. Patient is also exercising 4 days a week and meeting with a  today.      Candidate for surgery (per RD): yes    Dietitian: Jerson Sargent

## 2018-12-10 ENCOUNTER — OFFICE VISIT (OUTPATIENT)
Dept: SURGERY | Age: 33
End: 2018-12-10

## 2018-12-10 ENCOUNTER — DOCUMENTATION ONLY (OUTPATIENT)
Dept: SURGERY | Age: 33
End: 2018-12-10

## 2018-12-10 ENCOUNTER — HOSPITAL ENCOUNTER (OUTPATIENT)
Dept: LAB | Age: 33
Discharge: HOME | End: 2018-12-10

## 2018-12-10 ENCOUNTER — HOSPITAL ENCOUNTER (OUTPATIENT)
Dept: PREADMISSION TESTING | Age: 33
Discharge: HOME | End: 2018-12-10
Payer: MEDICAID

## 2018-12-10 VITALS
WEIGHT: 214.2 LBS | HEIGHT: 64 IN | SYSTOLIC BLOOD PRESSURE: 148 MMHG | OXYGEN SATURATION: 99 % | BODY MASS INDEX: 36.57 KG/M2 | TEMPERATURE: 97.7 F | RESPIRATION RATE: 16 BRPM | DIASTOLIC BLOOD PRESSURE: 66 MMHG | HEART RATE: 80 BPM

## 2018-12-10 DIAGNOSIS — I10 HYPERTENSION, UNSPECIFIED TYPE: ICD-10-CM

## 2018-12-10 DIAGNOSIS — E66.01 MORBID OBESITY (HCC): ICD-10-CM

## 2018-12-10 DIAGNOSIS — Z01.812 BLOOD TESTS PRIOR TO TREATMENT OR PROCEDURE: ICD-10-CM

## 2018-12-10 DIAGNOSIS — G47.33 OSA (OBSTRUCTIVE SLEEP APNEA): ICD-10-CM

## 2018-12-10 DIAGNOSIS — G89.18 POSTOPERATIVE PAIN: ICD-10-CM

## 2018-12-10 DIAGNOSIS — I10 ESSENTIAL HYPERTENSION, MALIGNANT: Primary | ICD-10-CM

## 2018-12-10 DIAGNOSIS — E66.01 SEVERE OBESITY WITH BODY MASS INDEX (BMI) OF 35.0 TO 39.9 WITH SERIOUS COMORBIDITY (HCC): Primary | ICD-10-CM

## 2018-12-10 DIAGNOSIS — K21.9 GASTROESOPHAGEAL REFLUX DISEASE, ESOPHAGITIS PRESENCE NOT SPECIFIED: ICD-10-CM

## 2018-12-10 DIAGNOSIS — I10 ESSENTIAL HYPERTENSION, MALIGNANT: ICD-10-CM

## 2018-12-10 LAB
ABO + RH BLD: NORMAL
BLOOD GROUP ANTIBODIES SERPL: NORMAL
SPECIMEN EXP DATE BLD: NORMAL
XX-LABCORP SPECIMEN COL,LCBCF: NORMAL

## 2018-12-10 PROCEDURE — 93005 ELECTROCARDIOGRAM TRACING: CPT

## 2018-12-10 PROCEDURE — 99001 SPECIMEN HANDLING PT-LAB: CPT

## 2018-12-10 PROCEDURE — 86901 BLOOD TYPING SEROLOGIC RH(D): CPT

## 2018-12-10 RX ORDER — OXYCODONE AND ACETAMINOPHEN 5; 325 MG/1; MG/1
1 TABLET ORAL
Qty: 30 TAB | Refills: 0 | Status: SHIPPED | OUTPATIENT
Start: 2018-12-10 | End: 2018-12-21

## 2018-12-10 RX ORDER — OMEPRAZOLE 20 MG/1
20 CAPSULE, DELAYED RELEASE ORAL DAILY
Qty: 30 CAP | Refills: 3 | Status: SHIPPED | OUTPATIENT
Start: 2018-12-10 | End: 2018-12-21

## 2018-12-10 NOTE — PROGRESS NOTES
Sleeve Gastrectomy - History and Physical 
Subjective: The patient is a 26 y.o. obese female with a Body mass index is 36.77 kg/m². .   she presents now to review their work up to date to see if they are a candidate for surgery and whether or not to proceed with the previously requested procedure. Bariatric comorbidities continue to include:  
Patient Active Problem List  
Diagnosis Code  Severe obesity with body mass index (BMI) of 35.0 to 39.9 with serious comorbidity (HCC) E66.01  
 GEORGE (obstructive sleep apnea) G47.33  
 Back pain M54.9  Edema extremities R60.0  GERD (gastroesophageal reflux disease) K21.9  ADHD (attention deficit hyperactivity disorder) F90.9  Anxiety F41.9  Hypertension I10  Degenerative disc disease, lumbar M51.36 They have been generally well prior to this visit and have had no recent significant illnesses. The patient has had no gastrointestinal issues that would preclude them from proceeding with the surgery they have chosen. Ashley Ochoa has recently tried a preoperative weight loss program  in addition to seeing a bariatric nutritionist preoperatively. We have discussed on at least one other occasion about the various types of surgical weight loss procedures and they have considered these options after our initial consultation. We have once again discussed these procedures in detail and they have now decided on a surgical procedure. They present today to discuss this and confirm that their evaluation pre operatively is acceptable to continue with surgery. The patient desires laparoscopic sleeve gastrectomy for surgical weight loss. The patients goal weight is 170 lb. (this represents a BMI of 28) These goals are consistent with expected outcomes of their desired operation. her Medical goals are resolution of these health issues.  
 
Since the patient's original consult 5 months ago they have been seen by their PCP for routine medical care. There has been no change to their overall medical or surgical history and they have been on no steroids in any form. Is followed by pain management for chronic back pain secondary to degenerative disc disease. Provider aware of this surgery. 
  
She has lost 23 lbs over the past 5 months 
  
UGI was normal  
 
Patient Active Problem List  
 Diagnosis Date Noted  Degenerative disc disease, lumbar  Severe obesity with body mass index (BMI) of 35.0 to 39.9 with serious comorbidity ([facility] [address] 07/06/2018  GEORGE (obstructive sleep apnea)  Back pain  Edema extremities  GERD (gastroesophageal reflux disease)  ADHD (attention deficit hyperactivity disorder)  Anxiety  Hypertension Past Surgical History:  
Procedure Laterality Date  HX WISDOM TEETH EXTRACTION Social History Tobacco Use  Smoking status: Never Smoker  Smokeless tobacco: Never Used Substance Use Topics  Alcohol use: No  
  
Family History Problem Relation Age of Onset  Stroke Mother  COPD Mother  Arthritis-rheumatoid Mother  Alcohol abuse Father  Depression Father  Drug Abuse Father  Diabetes Sister Current Outpatient Medications Medication Sig Dispense Refill  oxyCODONE-acetaminophen (PERCOCET) 5-325 mg per tablet Take 1 Tab by mouth every four (4) hours as needed for Pain. Max Daily Amount: 6 Tabs. 30 Tab 0  
 oxyCODONE-acetaminophen (PERCOCET) 5-325 mg per tablet Take  by mouth every four (4) hours as needed for Pain.  dextroamphetamine-amphetamine (ADDERALL) 30 mg tablet Take 30 mg by mouth.  hydroCHLOROthiazide (HYDRODIURIL) 12.5 mg tablet Take 12.5 mg by mouth daily.  omeprazole (PRILOSEC) 20 mg capsule Take 1 Cap by mouth daily. 30 Cap 3 No Known Allergies Review of Systems:  
 
General - No history or complaints of unexpected fever, chills, or weight loss Head/Neck - No history or complaints of headache, diplopia, dysphagia, hearing loss Cardiac - No history or complaints of chest pain, palpitations, murmur, or shortness of breath Pulmonary - No history or complaints of shortness of breath, productive cough, hemoptysis Gastrointestinal - No history or complaints of reflux,  abdominal pain, obstipation/constipation, blood per rectum Genitourinary - No history or complaints of hematuria/dysuria, stress urinary incontinence symptoms, or renal lithiasis Musculoskeletal - No history or complaints of joint pain or muscular weakness Hematologic - No history or complaints of bleeding disorders, blood transfusions, sickle cell anemia Neurologic - No history or complaints of  migraine headaches, seizure activity, syncopal episodes, TIA or stroke Integumentary - No history or complaints of rashes, abnormal nevi, skin cancer Gynecological - No abnormal bleeding or dysuria Objective:  
 
Visit Vitals /66 (BP 1 Location: Right arm, BP Patient Position: Sitting) Pulse 80 Temp 97.7 °F (36.5 °C) Resp 16 Ht 5' 4\" (1.626 m) Wt 97.2 kg (214 lb 3.2 oz) SpO2 99% BMI 36.77 kg/m² Physical Examination: General appearance - alert, well appearing, and in no distress and oriented to person, place, and time Mental status - alert, oriented to person, place, and time, normal mood, behavior, speech, dress, motor activity, and thought processes Eyes - pupils equal and reactive, extraocular eye movements intact, sclera anicteric, left eye normal, right eye normal 
Ears - bilateral TM's and external ear canals normal, right ear normal, left ear normal 
Nose - normal and patent, no erythema, discharge or polyps Mouth - mucous membranes moist, pharynx normal without lesions Neck - supple, no significant adenopathy Lymphatics - no palpable lymphadenopathy, no hepatosplenomegaly Chest - clear to auscultation, no wheezes, rales or rhonchi, symmetric air entry Heart - normal rate, regular rhythm, normal S1, S2, no murmurs, rubs, clicks or gallops Abdomen - soft, nontender, nondistended, no masses or organomegaly Back exam - full range of motion, no tenderness, palpable spasm or pain on motion Neurological - alert, oriented, normal speech, no focal findings or movement disorder noted Musculoskeletal - no joint tenderness, deformity or swelling Extremities - peripheral pulses normal, no pedal edema, no clubbing or cyanosis Skin - normal coloration and turgor, no rashes, no suspicious skin lesions noted Labs / Preoperative Evaluation:  
 
  
Recent Results (from the past 1008 hour(s)) EKG, 12 LEAD, INITIAL Collection Time: 12/10/18 11:51 AM  
Result Value Ref Range Ventricular Rate 59 BPM  
 Atrial Rate 59 BPM  
 P-R Interval 140 ms QRS Duration 92 ms Q-T Interval 436 ms  
 QTC Calculation (Bezet) 431 ms Calculated P Axis -28 degrees Calculated R Axis -13 degrees Calculated T Axis 15 degrees Diagnosis Sinus bradycardia Otherwise normal ECG No previous ECGs available [facility] SPECIMEN COL Collection Time: 12/10/18 12:00 PM  
Result Value Ref Range [facility] SPEC[facility]. Specimens collected/sent to [facility] [employer]. Please direct inquiries to ([phone]). TYPE & SCREEN Collection Time: 12/10/18 12:00 PM  
Result Value Ref Range Crossmatch Expiration 12/23/2018 ABO/Rh(D) PENDING Antibody screen PENDING Assessment: Morbid obesity with comorbidity Plan:  
 
laparoscopic sleeve gastrectomy This is a 26 y.o. female with a BMI of Body mass index is 36.77 kg/m². and the weight-related co-morbidties as noted above. Chasity Granado meets the NIH criteria for bariatric surgery based upon the BMI of Body mass index is 36.77 kg/m². and multiple weight-related co-morbidties.  Chasity Granado has elected laparoscopic sleeve gastrectomy as her intervention of choice for treatment of morbid obestiy through surgical means secondary to its safety profile, rapid return to work  and decreases in operative risks over gastric bypass. In the office today, following Romina's history and physical examination, a 40 minute discussion regarding the anatomic alterations for the laparoscopic sleeve gastrectomy was undertaken. The dietary expectations and the patient  dependent factors for success were thoroughly discussed, to include the need for interval follow-up and long-term dietary changes associated with success. The possible complications of the sleeve gastrectomy  were also discussed, to include;death, DVT/PE, staple line leak, bleeding, stricture formation, infection, nutritional deficiencies and sleeve dilation. Specific weight related outcomes for success were also discussed with an emphasis on careful and close follow-up with the first year and eating behavior modification as the baseline and cyclical hunger return. The patient expressed an understanding of the above factors, and her questions were answered in their entirety. In addition, the patient attended a 1.5 hour power point seminar regarding obesity, surgical weight loss including, adjustable gastric band, gastric bypass, and sleeve gastrectomy. This discussion contrasted the different surgical techniques, mechanisms of actions and expected outcomes, and surgical and medical risks associated with each procedure. During this seminar, there was a long question and answer session where each questions was answered until there were no additional questions. Today, the patient had all of her questions answered and the decision was made today that the patient's preoperative evaluation is acceptable for them  to proceed with bariatric surgery  choosing the sleeve gastrectomy as her surgical option. The patient understands the plan of action Secondary Diagnoses: DVT / Pulmonary Embolus Risk - The patient is at a higher risk for post operative DVT / pulmonary embolus secondary to their morbid obese status, relative sedentary lifestyle, and impending general anesthetic. We will plan to use anticoagulation therapy pre and post operative as well as  pneumatic compression devices and encourage ambulation once on the hospital nursing floor. The need for possible at home anticoagulation therapy has also been discussed and any decision on this matter will be made during post operative evaluations. The patient understands that their efforts at ambulation are of vital importance to reduce the risk of this complication thus placing significant burden on them as to the prevention of such issues. Signs and symptoms of DVT / PE have been discussed with the patient and they have been instructed to call the office if any these occur in the \"at home\" post op phase Obstructive Sleep Apnea -The patient understands the association of sleep apnea and obesity and the additional risk that it caries related to post surgical complications. We will have the patient bring their CPAP machine to the hospital for use both postoperatively in the PACU and on the floor at its appropriate setting.  We will have them continue using it while at home after surgery and follow up with their pulmonologist 6 months after to be retested to see if it can be discontinued at that time period. 
  
Hypertension - The patient has a clear understanding of how weight loss improves hypertension as a whole, but also they understand that there is a significant genetic component to this disease process.  We will monitor the patients blood pressure while in the hospital and the plan would be to continue those medications postoperatively.  If a diuretic is being used we will stop them on discharge to prevent dehydration particularly with the sleeve gastrectomy and the gastric bypass procedures.  They will be instructed to monitor their blood pressure postoperatively while at home and notify their primary care physician in the event of any significantly high or uncharacteristic readings. 
  
GERD -The patient understands that weight loss surgery is not a guaranteed cure for reflux disease but does understand the benefits that weight loss can have on reflux disease.  They also understand that at the time of surgery the gastroesophageal junction will be evaluated for the presence of a diaphragmatic hernia.  Hernias will be corrected always with the gastric band and sleeve gastrectomy procedures, but only on a case by case basis with the gastric bypass if it prevents our ability to perform the operation at hand, or if I feel that they would benefit long term with correction of this issue.  The patient also understands that neither weight loss surgery nor repair of a diaphragmatic hernia repair guarantees the complete cessation of the disease. They also understand there is a possibility of recurrence with a simple crural repair as is performed with these procedures. They understand they may have to continue their medications in the postoperative period. They have a good understanding that the gastric bypass procedure is better suited to total resolution of this issue and that neither the Lap Band nor sleeve gastrectomy is considered a curative procedure as it pertains to this diagnosis. 
  
 
 
 
Signed By: Nancye Denver, MD   
 December 10, 2018

## 2018-12-10 NOTE — PROGRESS NOTES
Attended bariatric surgery preoperative education class today. Appears to have understanding of surgical procedure, pre-op and post-op risks, and lifestyle changes. Asked appropriate questions.

## 2018-12-10 NOTE — PATIENT INSTRUCTIONS
Preparation for Surgery Refer to your book for specific instructions 1. Stop taking all aspirin products, ibuprofen products, non-steroidal medications, blood thinners,  and herbal supplements as outlined in your book. 2. Absolutely no smoking. 3. If diabetic, monitor blood sugars regularly and alert the office of blood sugars over 200. 
 
4. Have a supply of protein product and liquid diet items for your first two weeks as outlined in your book. 5. The day before your surgery is scheduled: 
6. ? Gastric Bypass and Sleeve:  Clear liquids and Protein Shakes ? Gastric Band:   Eat lightly. No snacking. ? Drink lots of water 6. Get prepared to meet a new you! Body Mass Index: Care Instructions Your Care Instructions Body mass index (BMI) can help you see if your weight is raising your risk for health problems. It uses a formula to compare how much you weigh with how tall you are. · A BMI lower than 18.5 is considered underweight. · A BMI between 18.5 and 24.9 is considered healthy. · A BMI between 25 and 29.9 is considered overweight. A BMI of 30 or higher is considered obese. If your BMI is in the normal range, it means that you have a lower risk for weight-related health problems. If your BMI is in the overweight or obese range, you may be at increased risk for weight-related health problems, such as high blood pressure, heart disease, stroke, arthritis or joint pain, and diabetes. If your BMI is in the underweight range, you may be at increased risk for health problems such as fatigue, lower protection (immunity) against illness, muscle loss, bone loss, hair loss, and hormone problems. BMI is just one measure of your risk for weight-related health problems. You may be at higher risk for health problems if you are not active, you eat an unhealthy diet, or you drink too much alcohol or use tobacco products. Follow-up care is a key part of your treatment and safety. Be sure to make and go to all appointments, and call your doctor if you are having problems. It's also a good idea to know your test results and keep a list of the medicines you take. How can you care for yourself at home? · Practice healthy eating habits. This includes eating plenty of fruits, vegetables, whole grains, lean protein, and low-fat dairy. · If your doctor recommends it, get more exercise. Walking is a good choice. Bit by bit, increase the amount you walk every day. Try for at least 30 minutes on most days of the week. · Do not smoke. Smoking can increase your risk for health problems. If you need help quitting, talk to your doctor about stop-smoking programs and medicines. These can increase your chances of quitting for good. · Limit alcohol to 2 drinks a day for men and 1 drink a day for women. Too much alcohol can cause health problems. If you have a BMI higher than 25 · Your doctor may do other tests to check your risk for weight-related health problems. This may include measuring the distance around your waist. A waist measurement of more than 40 inches in men or 35 inches in women can increase the risk of weight-related health problems. · Talk with your doctor about steps you can take to stay healthy or improve your health. You may need to make lifestyle changes to lose weight and stay healthy, such as changing your diet and getting regular exercise. If you have a BMI lower than 18.5 · Your doctor may do other tests to check your risk for health problems. · Talk with your doctor about steps you can take to stay healthy or improve your health. You may need to make lifestyle changes to gain or maintain weight and stay healthy, such as getting more healthy foods in your diet and doing exercises to build muscle. Where can you learn more? Go to http://desire-homar.info/. Enter S176 in the search box to learn more about \"Body Mass Index: Care Instructions. \" Current as of: June 26, 2018 Content Version: 11.8 © 8792-4617 [employer]. Care instructions adapted under license by [employer] (which disclaims liability or warranty for this information). If you have questions about a medical condition or this instruction, always ask your healthcare professional. [address] any warranty or liability for your use of this information. Preparation for Surgery Refer to your book for specific instructions 7. Stop taking all aspirin products, ibuprofen products, non-steroidal medications, blood thinners,  and herbal supplements as outlined in your book. 8. Absolutely no smoking. 9. If diabetic, monitor blood sugars regularly and alert the office of blood sugars over 200. 
 
10. Have a supply of protein product and liquid diet items for your first two weeks as outlined in your book. 11. The day before your surgery is scheduled: 
12. ? Gastric Bypass and Sleeve:  Clear liquids and Protein Shakes ? Gastric Band:   Eat lightly. No snacking. ? Drink lots of water 6. Get prepared to meet a new you!

## 2018-12-10 NOTE — PROGRESS NOTES
Appears to have a good understanding of the diet progression, food choices, and dietary/exercise habits for successful weight loss and nourishment after surgery. The class material included: post-op diet progression, including liquid, pureed, and low fat, low sugar food recommendations; proper food group choices, and encouraging dietary and exercise habits that lead to weight loss success.      Genevieve Tsang RD

## 2018-12-10 NOTE — H&P (VIEW-ONLY)
Sleeve Gastrectomy - History and Physical    Subjective: The patient is a 26 y.o. obese female with a Body mass index is 36.77 kg/m². .   she presents now to review their work up to date to see if they are a candidate for surgery and whether or not to proceed with the previously requested procedure. Bariatric comorbidities continue to include:   Patient Active Problem List   Diagnosis Code    Severe obesity with body mass index (BMI) of 35.0 to 39.9 with serious comorbidity (HCC) E66.01    GEORGE (obstructive sleep apnea) G47.33    Back pain M54.9    Edema extremities R60.0    GERD (gastroesophageal reflux disease) K21.9    ADHD (attention deficit hyperactivity disorder) F90.9    Anxiety F41.9    Hypertension I10    Degenerative disc disease, lumbar M51.36       They have been generally well prior to this visit and have had no recent significant illnesses. The patient has had no gastrointestinal issues that would preclude them from proceeding with the surgery they have chosen. Arabella Calix has recently tried a preoperative weight loss program  in addition to seeing a bariatric nutritionist preoperatively. We have discussed on at least one other occasion about the various types of surgical weight loss procedures and they have considered these options after our initial consultation. We have once again discussed these procedures in detail and they have now decided on a surgical procedure. They present today to discuss this and confirm that their evaluation pre operatively is acceptable to continue with surgery. The patient desires laparoscopic sleeve gastrectomy for surgical weight loss. The patients goal weight is 170 lb. (this represents a BMI of 28)    These goals are consistent with expected outcomes of their desired operation. her Medical goals are resolution of these health issues.     Since the patient's original consult 5 months ago they have been seen by their PCP for routine medical care.  There has been no change to their overall medical or surgical history and they have been on no steroids in any form. Is followed by pain management for chronic back pain secondary to degenerative disc disease. Provider aware of this surgery.     She has lost 23 lbs over the past 5 months     UGI was normal     Patient Active Problem List    Diagnosis Date Noted    Degenerative disc disease, lumbar     Severe obesity with body mass index (BMI) of 35.0 to 39.9 with serious comorbidity ([address] 07/06/2018    GEORGE (obstructive sleep apnea)     Back pain     Edema extremities     GERD (gastroesophageal reflux disease)     ADHD (attention deficit hyperactivity disorder)     Anxiety     Hypertension      Past Surgical History:   Procedure Laterality Date    HX WISDOM TEETH EXTRACTION        Social History     Tobacco Use    Smoking status: Never Smoker    Smokeless tobacco: Never Used   Substance Use Topics    Alcohol use: No      Family History   Problem Relation Age of Onset    Stroke Mother     COPD Mother    [facility] Arthritis-rheumatoid Mother     Alcohol abuse Father     Depression Father     Drug Abuse Father     Diabetes Sister       Current Outpatient Medications   Medication Sig Dispense Refill    oxyCODONE-acetaminophen (PERCOCET) 5-325 mg per tablet Take 1 Tab by mouth every four (4) hours as needed for Pain. Max Daily Amount: 6 Tabs. 30 Tab 0    oxyCODONE-acetaminophen (PERCOCET) 5-325 mg per tablet Take  by mouth every four (4) hours as needed for Pain.  dextroamphetamine-amphetamine (ADDERALL) 30 mg tablet Take 30 mg by mouth.  hydroCHLOROthiazide (HYDRODIURIL) 12.5 mg tablet Take 12.5 mg by mouth daily.  omeprazole (PRILOSEC) 20 mg capsule Take 1 Cap by mouth daily.  30 Cap 3     No Known Allergies     Review of Systems:     General - No history or complaints of unexpected fever, chills, or weight loss  Head/Neck - No history or complaints of headache, diplopia, dysphagia, hearing loss  Cardiac - No history or complaints of chest pain, palpitations, murmur, or shortness of breath  Pulmonary - No history or complaints of shortness of breath, productive cough, hemoptysis  Gastrointestinal - No history or complaints of reflux,  abdominal pain, obstipation/constipation, blood per rectum  Genitourinary - No history or complaints of hematuria/dysuria, stress urinary incontinence symptoms, or renal lithiasis  Musculoskeletal - No history or complaints of joint pain or muscular weakness  Hematologic - No history or complaints of bleeding disorders, blood transfusions, sickle cell anemia  Neurologic - No history or complaints of  migraine headaches, seizure activity, syncopal episodes, TIA or stroke  Integumentary - No history or complaints of rashes, abnormal nevi, skin cancer  Gynecological - No abnormal bleeding or dysuria    Objective:     Visit Vitals  /66 (BP 1 Location: Right arm, BP Patient Position: Sitting)   Pulse 80   Temp 97.7 °F (36.5 °C)   Resp 16   Ht 5' 4\" (1.626 m)   Wt 97.2 kg (214 lb 3.2 oz)   SpO2 99%   BMI 36.77 kg/m²       Physical Examination: General appearance - alert, well appearing, and in no distress and oriented to person, place, and time  Mental status - alert, oriented to person, place, and time, normal mood, behavior, speech, dress, motor activity, and thought processes  Eyes - pupils equal and reactive, extraocular eye movements intact, sclera anicteric, left eye normal, right eye normal  Ears - bilateral TM's and external ear canals normal, right ear normal, left ear normal  Nose - normal and patent, no erythema, discharge or polyps  Mouth - mucous membranes moist, pharynx normal without lesions  Neck - supple, no significant adenopathy  Lymphatics - no palpable lymphadenopathy, no hepatosplenomegaly  Chest - clear to auscultation, no wheezes, rales or rhonchi, symmetric air entry  Heart - normal rate, regular rhythm, normal S1, S2, no murmurs, rubs, clicks or gallops  Abdomen - soft, nontender, nondistended, no masses or organomegaly  Back exam - full range of motion, no tenderness, palpable spasm or pain on motion  Neurological - alert, oriented, normal speech, no focal findings or movement disorder noted  Musculoskeletal - no joint tenderness, deformity or swelling  Extremities - peripheral pulses normal, no pedal edema, no clubbing or cyanosis  Skin - normal coloration and turgor, no rashes, no suspicious skin lesions noted    Labs / Preoperative Evaluation:        Recent Results (from the past 1008 hour(s))   EKG, 12 LEAD, INITIAL    Collection Time: 12/10/18 11:51 AM   Result Value Ref Range    Ventricular Rate 59 BPM    Atrial Rate 59 BPM    P-R Interval 140 ms    QRS Duration 92 ms    Q-T Interval 436 ms    QTC Calculation (Bezet) 431 ms    Calculated P Axis -28 degrees    Calculated R Axis -13 degrees    Calculated T Axis 15 degrees    Diagnosis       Sinus bradycardia  Otherwise normal ECG  No previous ECGs available     [facility] SPECIMEN COL    Collection Time: 12/10/18 12:00 PM   Result Value Ref Range    [facility] SPEC[facility]. Specimens collected/sent to [facility]. Please direct inquiries to ([phone]). TYPE & SCREEN    Collection Time: 12/10/18 12:00 PM   Result Value Ref Range    Crossmatch Expiration 12/23/2018     ABO/Rh(D) PENDING     Antibody screen PENDING        Assessment:     Morbid obesity with comorbidity    Plan:     laparoscopic sleeve gastrectomy    This is a 26 y.o. female with a BMI of Body mass index is 36.77 kg/m². and the weight-related co-morbidties as noted above. Harmony Aly meets the NIH criteria for bariatric surgery based upon the BMI of Body mass index is 36.77 kg/m². and multiple weight-related co-morbidties.  Harmony Aly has elected laparoscopic sleeve gastrectomy as her intervention of choice for treatment of morbid obestiy through surgical means secondary to its safety profile, rapid return to work  and decreases in operative risks over gastric bypass. In the office today, following Romina's history and physical examination, a 40 minute discussion regarding the anatomic alterations for the laparoscopic sleeve gastrectomy was undertaken. The dietary expectations and the patient  dependent factors for success were thoroughly discussed, to include the need for interval follow-up and long-term dietary changes associated with success. The possible complications of the sleeve gastrectomy  were also discussed, to include;death, DVT/PE, staple line leak, bleeding, stricture formation, infection, nutritional deficiencies and sleeve dilation. Specific weight related outcomes for success were also discussed with an emphasis on careful and close follow-up with the first year and eating behavior modification as the baseline and cyclical hunger return. The patient expressed an understanding of the above factors, and her questions were answered in their entirety. In addition, the patient attended a 1.5 hour power point seminar regarding obesity, surgical weight loss including, adjustable gastric band, gastric bypass, and sleeve gastrectomy. This discussion contrasted the different surgical techniques, mechanisms of actions and expected outcomes, and surgical and medical risks associated with each procedure. During this seminar, there was a long question and answer session where each questions was answered until there were no additional questions. Today, the patient had all of her questions answered and the decision was made today that the patient's preoperative evaluation is acceptable for them  to proceed with bariatric surgery  choosing the sleeve gastrectomy as her surgical option.     The patient understands the plan of action      Secondary Diagnoses:     DVT / Pulmonary Embolus Risk - The patient is at a higher risk for post operative DVT / pulmonary embolus secondary to their morbid obese status, relative sedentary lifestyle, and impending general anesthetic. We will plan to use anticoagulation therapy pre and post operative as well as  pneumatic compression devices and encourage ambulation once on the hospital nursing floor. The need for possible at home anticoagulation therapy has also been discussed and any decision on this matter will be made during post operative evaluations. The patient understands that their efforts at ambulation are of vital importance to reduce the risk of this complication thus placing significant burden on them as to the prevention of such issues. Signs and symptoms of DVT / PE have been discussed with the patient and they have been instructed to call the office if any these occur in the \"at home\" post op phase    Obstructive Sleep Apnea -The patient understands the association of sleep apnea and obesity and the additional risk that it caries related to post surgical complications. We will have the patient bring their CPAP machine to the hospital for use both postoperatively in the PACU and on the floor at its appropriate setting.  We will have them continue using it while at home after surgery and follow up with their pulmonologist 6 months after to be retested to see if it can be discontinued at that time period.     Hypertension - The patient has a clear understanding of how weight loss improves hypertension as a whole, but also they understand that there is a significant genetic component to this disease process.  We will monitor the patients blood pressure while in the hospital and the plan would be to continue those medications postoperatively.  If a diuretic is being used we will stop them on discharge to prevent dehydration particularly with the sleeve gastrectomy and the gastric bypass procedures.  They will be instructed to monitor their blood pressure postoperatively while at home and notify their primary care physician in the event of any significantly high or uncharacteristic readings.     GERD -The patient understands that weight loss surgery is not a guaranteed cure for reflux disease but does understand the benefits that weight loss can have on reflux disease.  They also understand that at the time of surgery the gastroesophageal junction will be evaluated for the presence of a diaphragmatic hernia.  Hernias will be corrected always with the gastric band and sleeve gastrectomy procedures, but only on a case by case basis with the gastric bypass if it prevents our ability to perform the operation at hand, or if I feel that they would benefit long term with correction of this issue.  The patient also understands that neither weight loss surgery nor repair of a diaphragmatic hernia repair guarantees the complete cessation of the disease. They also understand there is a possibility of recurrence with a simple crural repair as is performed with these procedures. They understand they may have to continue their medications in the postoperative period.  They have a good understanding that the gastric bypass procedure is better suited to total resolution of this issue and that neither the Lap Band nor sleeve gastrectomy is considered a curative procedure as it pertains to this diagnosis.           Signed By: Felicita Gregorio MD     December 10, 2018

## 2018-12-12 LAB
ABO GROUP BLD: NORMAL
ALBUMIN SERPL-MCNC: 4.1 G/DL (ref 3.5–5.5)
ALBUMIN/GLOB SERPL: 1.6 {RATIO} (ref 1.2–2.2)
ALP SERPL-CCNC: 67 IU/L (ref 39–117)
ALT SERPL-CCNC: 12 IU/L (ref 0–32)
AST SERPL-CCNC: 12 IU/L (ref 0–40)
BASOPHILS # BLD AUTO: 0 X10E3/UL (ref 0–0.2)
BASOPHILS NFR BLD AUTO: 0 %
BILIRUB SERPL-MCNC: 0.4 MG/DL (ref 0–1.2)
BLD GP AB SCN SERPL QL: NEGATIVE
BUN SERPL-MCNC: 12 MG/DL (ref 6–20)
BUN/CREAT SERPL: 17 (ref 9–23)
CALCIUM SERPL-MCNC: 9.1 MG/DL (ref 8.7–10.2)
CHLORIDE SERPL-SCNC: 101 MMOL/L (ref 96–106)
CO2 SERPL-SCNC: 22 MMOL/L (ref 20–29)
CREAT SERPL-MCNC: 0.7 MG/DL (ref 0.57–1)
EOSINOPHIL # BLD AUTO: 0.1 X10E3/UL (ref 0–0.4)
EOSINOPHIL NFR BLD AUTO: 1 %
ERYTHROCYTE [DISTWIDTH] IN BLOOD BY AUTOMATED COUNT: 13.6 % (ref 12.3–15.4)
GLOBULIN SER CALC-MCNC: 2.5 G/DL (ref 1.5–4.5)
GLUCOSE SERPL-MCNC: 90 MG/DL (ref 65–99)
HCT VFR BLD AUTO: 39.1 % (ref 34–46.6)
HGB BLD-MCNC: 12.6 G/DL (ref 11.1–15.9)
IMM GRANULOCYTES # BLD: 0 X10E3/UL (ref 0–0.1)
IMM GRANULOCYTES NFR BLD: 0 %
LYMPHOCYTES # BLD AUTO: 3.1 X10E3/UL (ref 0.7–3.1)
LYMPHOCYTES NFR BLD AUTO: 38 %
MCH RBC QN AUTO: 28.5 PG (ref 26.6–33)
MCHC RBC AUTO-ENTMCNC: 32.2 G/DL (ref 31.5–35.7)
MCV RBC AUTO: 89 FL (ref 79–97)
MONOCYTES # BLD AUTO: 0.4 X10E3/UL (ref 0.1–0.9)
MONOCYTES NFR BLD AUTO: 5 %
NEUTROPHILS # BLD AUTO: 4.6 X10E3/UL (ref 1.4–7)
NEUTROPHILS NFR BLD AUTO: 56 %
PLATELET # BLD AUTO: 322 X10E3/UL (ref 150–379)
POTASSIUM SERPL-SCNC: 4 MMOL/L (ref 3.5–5.2)
PROT SERPL-MCNC: 6.6 G/DL (ref 6–8.5)
RBC # BLD AUTO: 4.42 X10E6/UL (ref 3.77–5.28)
RH BLD: POSITIVE
SODIUM SERPL-SCNC: 139 MMOL/L (ref 134–144)
WBC # BLD AUTO: 8.1 X10E3/UL (ref 3.4–10.8)

## 2018-12-13 LAB
ATRIAL RATE: 59 BPM
CALCULATED P AXIS, ECG09: -28 DEGREES
CALCULATED R AXIS, ECG10: -13 DEGREES
CALCULATED T AXIS, ECG11: 15 DEGREES
DIAGNOSIS, 93000: NORMAL
P-R INTERVAL, ECG05: 140 MS
Q-T INTERVAL, ECG07: 436 MS
QRS DURATION, ECG06: 92 MS
QTC CALCULATION (BEZET), ECG08: 431 MS
VENTRICULAR RATE, ECG03: 59 BPM

## 2018-12-20 ENCOUNTER — ANESTHESIA (OUTPATIENT)
Dept: SURGERY | Age: 33
DRG: 621 | End: 2018-12-20
Payer: COMMERCIAL

## 2018-12-20 ENCOUNTER — HOSPITAL ENCOUNTER (INPATIENT)
Age: 33
LOS: 1 days | Discharge: HOME | DRG: 621 | End: 2018-12-21
Attending: SPECIALIST | Admitting: SPECIALIST
Payer: COMMERCIAL

## 2018-12-20 ENCOUNTER — ANESTHESIA EVENT (OUTPATIENT)
Dept: SURGERY | Age: 33
DRG: 621 | End: 2018-12-20
Payer: COMMERCIAL

## 2018-12-20 DIAGNOSIS — Z98.84 S/P BARIATRIC SURGERY: ICD-10-CM

## 2018-12-20 DIAGNOSIS — G89.18 POST-OPERATIVE PAIN: Primary | ICD-10-CM

## 2018-12-20 PROBLEM — E66.01 SEVERE OBESITY (BMI 35.0-39.9) WITH COMORBIDITY (HCC): Status: ACTIVE | Noted: 2018-12-20

## 2018-12-20 LAB — HCG UR QL: NEGATIVE

## 2018-12-20 PROCEDURE — 0FB24ZX EXCISION OF LEFT LOBE LIVER, PERCUTANEOUS ENDOSCOPIC APPROACH, DIAGNOSTIC: ICD-10-PCS | Performed by: SPECIALIST

## 2018-12-20 PROCEDURE — 65270000029 HC RM PRIVATE

## 2018-12-20 PROCEDURE — 74011250636 HC RX REV CODE- 250/636: Performed by: SPECIALIST

## 2018-12-20 PROCEDURE — 74011000250 HC RX REV CODE- 250: Performed by: SPECIALIST

## 2018-12-20 PROCEDURE — 88313 SPECIAL STAINS GROUP 2: CPT

## 2018-12-20 PROCEDURE — 77030010515 HC APPL ENDOCLP LIG J&J -B: Performed by: SPECIALIST

## 2018-12-20 PROCEDURE — 74011000250 HC RX REV CODE- 250

## 2018-12-20 PROCEDURE — 77030020255 HC SOL INJ LR 1000ML BG: Performed by: SPECIALIST

## 2018-12-20 PROCEDURE — 77030006643: Performed by: SPECIALIST

## 2018-12-20 PROCEDURE — 0DB64Z3 EXCISION OF STOMACH, PERCUTANEOUS ENDOSCOPIC APPROACH, VERTICAL: ICD-10-PCS | Performed by: SPECIALIST

## 2018-12-20 PROCEDURE — 77010033678 HC OXYGEN DAILY

## 2018-12-20 PROCEDURE — 77030033200 HC PRT CLSR CRTR THOMP COOP -C: Performed by: SPECIALIST

## 2018-12-20 PROCEDURE — 77030027876 HC STPLR ENDOSC FLX PWR J&J -G1: Performed by: SPECIALIST

## 2018-12-20 PROCEDURE — 77030020407 HC IV BLD WRMR ST 3M -A: Performed by: SPECIALIST

## 2018-12-20 PROCEDURE — 0DB78ZX EXCISION OF STOMACH, PYLORUS, VIA NATURAL OR ARTIFICIAL OPENING ENDOSCOPIC, DIAGNOSTIC: ICD-10-PCS | Performed by: SPECIALIST

## 2018-12-20 PROCEDURE — 77030032490 HC SLV COMPR SCD KNE COVD -B: Performed by: SPECIALIST

## 2018-12-20 PROCEDURE — 77030008603 HC TRCR ENDOSC EPATH J&J -C: Performed by: SPECIALIST

## 2018-12-20 PROCEDURE — 77030003580 HC NDL INSUF VERES J&J -B: Performed by: SPECIALIST

## 2018-12-20 PROCEDURE — 77030009426 HC FCPS BIOP ENDOSC BSC -B: Performed by: SPECIALIST

## 2018-12-20 PROCEDURE — 76010000149 HC OR TIME 1 TO 1.5 HR: Performed by: SPECIALIST

## 2018-12-20 PROCEDURE — 76060000033 HC ANESTHESIA 1 TO 1.5 HR: Performed by: SPECIALIST

## 2018-12-20 PROCEDURE — 88307 TISSUE EXAM BY PATHOLOGIST: CPT

## 2018-12-20 PROCEDURE — 77030002916 HC SUT ETHLN J&J -A: Performed by: SPECIALIST

## 2018-12-20 PROCEDURE — 77030002966 HC SUT PDS J&J -A: Performed by: SPECIALIST

## 2018-12-20 PROCEDURE — 77030002933 HC SUT MCRYL J&J -A: Performed by: SPECIALIST

## 2018-12-20 PROCEDURE — 76210000063 HC OR PH I REC FIRST 0.5 HR: Performed by: SPECIALIST

## 2018-12-20 PROCEDURE — 77030034154 HC SHR COAG HARM ACE J&J -F: Performed by: SPECIALIST

## 2018-12-20 PROCEDURE — 77030012407 HC DRN WND BARD -B: Performed by: SPECIALIST

## 2018-12-20 PROCEDURE — 77030020782 HC GWN BAIR PAWS FLX 3M -B: Performed by: SPECIALIST

## 2018-12-20 PROCEDURE — 77030008477 HC STYL SATN SLP COVD -A: Performed by: SPECIALIST

## 2018-12-20 PROCEDURE — 77030018836 HC SOL IRR NACL ICUM -A: Performed by: SPECIALIST

## 2018-12-20 PROCEDURE — 77030034029 HC SLV GASTRCTMY CAL SYS DISP BOEH -C: Performed by: SPECIALIST

## 2018-12-20 PROCEDURE — 88305 TISSUE EXAM BY PATHOLOGIST: CPT

## 2018-12-20 PROCEDURE — 77030009968 HC RELD STPLR ENDOSC J&J -D: Performed by: SPECIALIST

## 2018-12-20 PROCEDURE — 77030013567 HC DRN WND RESERV BARD -A: Performed by: SPECIALIST

## 2018-12-20 PROCEDURE — 74011250637 HC RX REV CODE- 250/637: Performed by: SPECIALIST

## 2018-12-20 PROCEDURE — 81025 URINE PREGNANCY TEST: CPT

## 2018-12-20 PROCEDURE — 74011250636 HC RX REV CODE- 250/636

## 2018-12-20 PROCEDURE — 77030008602 HC TRCR ENDOSC EPATH J&J -B: Performed by: SPECIALIST

## 2018-12-20 PROCEDURE — 77030008683 HC TU ET CUF COVD -A: Performed by: SPECIALIST

## 2018-12-20 PROCEDURE — 77030022585 HC SEAL FBRN EVICEL J&J -F: Performed by: SPECIALIST

## 2018-12-20 PROCEDURE — 77030002912 HC SUT ETHBND J&J -A: Performed by: SPECIALIST

## 2018-12-20 RX ORDER — NYSTATIN 100000 [USP'U]/ML
500000 SUSPENSION ORAL
Status: COMPLETED | OUTPATIENT
Start: 2018-12-20 | End: 2018-12-20

## 2018-12-20 RX ORDER — DEXMEDETOMIDINE HYDROCHLORIDE 4 UG/ML
INJECTION, SOLUTION INTRAVENOUS AS NEEDED
Status: DISCONTINUED | OUTPATIENT
Start: 2018-12-20 | End: 2018-12-20 | Stop reason: HOSPADM

## 2018-12-20 RX ORDER — NALOXONE HYDROCHLORIDE 0.4 MG/ML
0.2 INJECTION, SOLUTION INTRAMUSCULAR; INTRAVENOUS; SUBCUTANEOUS AS NEEDED
Status: DISCONTINUED | OUTPATIENT
Start: 2018-12-20 | End: 2018-12-20 | Stop reason: HOSPADM

## 2018-12-20 RX ORDER — MIDAZOLAM HYDROCHLORIDE 1 MG/ML
INJECTION, SOLUTION INTRAMUSCULAR; INTRAVENOUS AS NEEDED
Status: DISCONTINUED | OUTPATIENT
Start: 2018-12-20 | End: 2018-12-20 | Stop reason: HOSPADM

## 2018-12-20 RX ORDER — SODIUM CHLORIDE 0.9 % (FLUSH) 0.9 %
5-10 SYRINGE (ML) INJECTION EVERY 8 HOURS
Status: DISCONTINUED | OUTPATIENT
Start: 2018-12-20 | End: 2018-12-20

## 2018-12-20 RX ORDER — DEXTROSE 50 % IN WATER (D50W) INTRAVENOUS SYRINGE
25-50 AS NEEDED
Status: DISCONTINUED | OUTPATIENT
Start: 2018-12-20 | End: 2018-12-20 | Stop reason: HOSPADM

## 2018-12-20 RX ORDER — NEOSTIGMINE METHYLSULFATE 5 MG/5 ML
SYRINGE (ML) INTRAVENOUS AS NEEDED
Status: DISCONTINUED | OUTPATIENT
Start: 2018-12-20 | End: 2018-12-20 | Stop reason: HOSPADM

## 2018-12-20 RX ORDER — MAGNESIUM SULFATE 100 %
4 CRYSTALS MISCELLANEOUS AS NEEDED
Status: DISCONTINUED | OUTPATIENT
Start: 2018-12-20 | End: 2018-12-20 | Stop reason: HOSPADM

## 2018-12-20 RX ORDER — ACETAMINOPHEN 10 MG/ML
1000 INJECTION, SOLUTION INTRAVENOUS EVERY 6 HOURS
Status: COMPLETED | OUTPATIENT
Start: 2018-12-20 | End: 2018-12-21

## 2018-12-20 RX ORDER — CEFAZOLIN SODIUM/WATER 2 G/20 ML
2 SYRINGE (ML) INTRAVENOUS EVERY 8 HOURS
Status: COMPLETED | OUTPATIENT
Start: 2018-12-20 | End: 2018-12-21

## 2018-12-20 RX ORDER — FAMOTIDINE 10 MG/ML
20 INJECTION INTRAVENOUS ONCE
Status: COMPLETED | OUTPATIENT
Start: 2018-12-20 | End: 2018-12-20

## 2018-12-20 RX ORDER — LIDOCAINE HYDROCHLORIDE 20 MG/ML
INJECTION, SOLUTION EPIDURAL; INFILTRATION; INTRACAUDAL; PERINEURAL AS NEEDED
Status: DISCONTINUED | OUTPATIENT
Start: 2018-12-20 | End: 2018-12-20 | Stop reason: HOSPADM

## 2018-12-20 RX ORDER — ONDANSETRON 2 MG/ML
INJECTION INTRAMUSCULAR; INTRAVENOUS AS NEEDED
Status: DISCONTINUED | OUTPATIENT
Start: 2018-12-20 | End: 2018-12-20 | Stop reason: HOSPADM

## 2018-12-20 RX ORDER — FENTANYL CITRATE 50 UG/ML
INJECTION, SOLUTION INTRAMUSCULAR; INTRAVENOUS AS NEEDED
Status: DISCONTINUED | OUTPATIENT
Start: 2018-12-20 | End: 2018-12-20 | Stop reason: HOSPADM

## 2018-12-20 RX ORDER — SODIUM CHLORIDE, SODIUM LACTATE, POTASSIUM CHLORIDE, CALCIUM CHLORIDE 600; 310; 30; 20 MG/100ML; MG/100ML; MG/100ML; MG/100ML
150 INJECTION, SOLUTION INTRAVENOUS CONTINUOUS
Status: DISCONTINUED | OUTPATIENT
Start: 2018-12-20 | End: 2018-12-21 | Stop reason: HOSPADM

## 2018-12-20 RX ORDER — ACETAMINOPHEN 10 MG/ML
1000 INJECTION, SOLUTION INTRAVENOUS ONCE
Status: COMPLETED | OUTPATIENT
Start: 2018-12-20 | End: 2018-12-20

## 2018-12-20 RX ORDER — NALOXONE HYDROCHLORIDE 0.4 MG/ML
0.4 INJECTION, SOLUTION INTRAMUSCULAR; INTRAVENOUS; SUBCUTANEOUS AS NEEDED
Status: DISCONTINUED | OUTPATIENT
Start: 2018-12-20 | End: 2018-12-21 | Stop reason: HOSPADM

## 2018-12-20 RX ORDER — PROPOFOL 10 MG/ML
INJECTION, EMULSION INTRAVENOUS AS NEEDED
Status: DISCONTINUED | OUTPATIENT
Start: 2018-12-20 | End: 2018-12-20 | Stop reason: HOSPADM

## 2018-12-20 RX ORDER — BUPIVACAINE HYDROCHLORIDE AND EPINEPHRINE 2.5; 5 MG/ML; UG/ML
INJECTION, SOLUTION EPIDURAL; INFILTRATION; INTRACAUDAL; PERINEURAL AS NEEDED
Status: DISCONTINUED | OUTPATIENT
Start: 2018-12-20 | End: 2018-12-20 | Stop reason: HOSPADM

## 2018-12-20 RX ORDER — KETAMINE HYDROCHLORIDE 10 MG/ML
INJECTION, SOLUTION INTRAMUSCULAR; INTRAVENOUS AS NEEDED
Status: DISCONTINUED | OUTPATIENT
Start: 2018-12-20 | End: 2018-12-20 | Stop reason: HOSPADM

## 2018-12-20 RX ORDER — HYDROMORPHONE HYDROCHLORIDE 1 MG/ML
INJECTION, SOLUTION INTRAMUSCULAR; INTRAVENOUS; SUBCUTANEOUS AS NEEDED
Status: DISCONTINUED | OUTPATIENT
Start: 2018-12-20 | End: 2018-12-20 | Stop reason: HOSPADM

## 2018-12-20 RX ORDER — ENOXAPARIN SODIUM 100 MG/ML
40 INJECTION SUBCUTANEOUS EVERY 12 HOURS
Status: DISCONTINUED | OUTPATIENT
Start: 2018-12-20 | End: 2018-12-21 | Stop reason: HOSPADM

## 2018-12-20 RX ORDER — SODIUM CHLORIDE 0.9 % (FLUSH) 0.9 %
5-10 SYRINGE (ML) INJECTION AS NEEDED
Status: DISCONTINUED | OUTPATIENT
Start: 2018-12-20 | End: 2018-12-20

## 2018-12-20 RX ORDER — MORPHINE SULFATE 4 MG/ML
8 INJECTION INTRAVENOUS
Status: DISCONTINUED | OUTPATIENT
Start: 2018-12-20 | End: 2018-12-21

## 2018-12-20 RX ORDER — SODIUM CHLORIDE, SODIUM LACTATE, POTASSIUM CHLORIDE, CALCIUM CHLORIDE 600; 310; 30; 20 MG/100ML; MG/100ML; MG/100ML; MG/100ML
125 INJECTION, SOLUTION INTRAVENOUS CONTINUOUS
Status: DISCONTINUED | OUTPATIENT
Start: 2018-12-20 | End: 2018-12-20

## 2018-12-20 RX ORDER — SODIUM CHLORIDE, SODIUM LACTATE, POTASSIUM CHLORIDE, CALCIUM CHLORIDE 600; 310; 30; 20 MG/100ML; MG/100ML; MG/100ML; MG/100ML
100 INJECTION, SOLUTION INTRAVENOUS CONTINUOUS
Status: DISCONTINUED | OUTPATIENT
Start: 2018-12-20 | End: 2018-12-20 | Stop reason: HOSPADM

## 2018-12-20 RX ORDER — KETOROLAC TROMETHAMINE 30 MG/ML
15 INJECTION, SOLUTION INTRAMUSCULAR; INTRAVENOUS EVERY 6 HOURS
Status: DISCONTINUED | OUTPATIENT
Start: 2018-12-20 | End: 2018-12-21 | Stop reason: HOSPADM

## 2018-12-20 RX ORDER — GLYCOPYRROLATE 0.2 MG/ML
INJECTION INTRAMUSCULAR; INTRAVENOUS AS NEEDED
Status: DISCONTINUED | OUTPATIENT
Start: 2018-12-20 | End: 2018-12-20 | Stop reason: HOSPADM

## 2018-12-20 RX ORDER — INSULIN LISPRO 100 [IU]/ML
INJECTION, SOLUTION INTRAVENOUS; SUBCUTANEOUS ONCE
Status: DISCONTINUED | OUTPATIENT
Start: 2018-12-20 | End: 2018-12-20 | Stop reason: HOSPADM

## 2018-12-20 RX ORDER — FENTANYL CITRATE 50 UG/ML
25 INJECTION, SOLUTION INTRAMUSCULAR; INTRAVENOUS
Status: DISCONTINUED | OUTPATIENT
Start: 2018-12-20 | End: 2018-12-20 | Stop reason: HOSPADM

## 2018-12-20 RX ORDER — ROCURONIUM BROMIDE 10 MG/ML
INJECTION, SOLUTION INTRAVENOUS AS NEEDED
Status: DISCONTINUED | OUTPATIENT
Start: 2018-12-20 | End: 2018-12-20 | Stop reason: HOSPADM

## 2018-12-20 RX ORDER — ENOXAPARIN SODIUM 100 MG/ML
40 INJECTION SUBCUTANEOUS ONCE
Status: COMPLETED | OUTPATIENT
Start: 2018-12-20 | End: 2018-12-20

## 2018-12-20 RX ORDER — ONDANSETRON 2 MG/ML
4 INJECTION INTRAMUSCULAR; INTRAVENOUS
Status: DISCONTINUED | OUTPATIENT
Start: 2018-12-20 | End: 2018-12-21 | Stop reason: HOSPADM

## 2018-12-20 RX ORDER — KETOROLAC TROMETHAMINE 30 MG/ML
INJECTION, SOLUTION INTRAMUSCULAR; INTRAVENOUS AS NEEDED
Status: DISCONTINUED | OUTPATIENT
Start: 2018-12-20 | End: 2018-12-20 | Stop reason: HOSPADM

## 2018-12-20 RX ORDER — CEFAZOLIN SODIUM/WATER 2 G/20 ML
2 SYRINGE (ML) INTRAVENOUS ONCE
Status: COMPLETED | OUTPATIENT
Start: 2018-12-20 | End: 2018-12-20

## 2018-12-20 RX ADMIN — PROPOFOL 200 MG: 10 INJECTION, EMULSION INTRAVENOUS at 09:10

## 2018-12-20 RX ADMIN — HYDROMORPHONE HYDROCHLORIDE 1 MG: 1 INJECTION, SOLUTION INTRAMUSCULAR; INTRAVENOUS; SUBCUTANEOUS at 10:13

## 2018-12-20 RX ADMIN — Medication 2 G: at 09:21

## 2018-12-20 RX ADMIN — SODIUM CHLORIDE, SODIUM LACTATE, POTASSIUM CHLORIDE, AND CALCIUM CHLORIDE 150 ML/HR: 600; 310; 30; 20 INJECTION, SOLUTION INTRAVENOUS at 18:19

## 2018-12-20 RX ADMIN — GLYCOPYRROLATE 1 MG: 0.2 INJECTION INTRAMUSCULAR; INTRAVENOUS at 10:13

## 2018-12-20 RX ADMIN — KETOROLAC TROMETHAMINE 15 MG: 30 INJECTION, SOLUTION INTRAMUSCULAR at 17:04

## 2018-12-20 RX ADMIN — DEXMEDETOMIDINE HYDROCHLORIDE 12 MCG: 4 INJECTION, SOLUTION INTRAVENOUS at 09:30

## 2018-12-20 RX ADMIN — SODIUM CHLORIDE, SODIUM LACTATE, POTASSIUM CHLORIDE, AND CALCIUM CHLORIDE 150 ML/HR: 600; 310; 30; 20 INJECTION, SOLUTION INTRAVENOUS at 11:51

## 2018-12-20 RX ADMIN — ACETAMINOPHEN 1000 MG: 10 INJECTION, SOLUTION INTRAVENOUS at 15:11

## 2018-12-20 RX ADMIN — ACETAMINOPHEN 1000 MG: 10 INJECTION, SOLUTION INTRAVENOUS at 09:04

## 2018-12-20 RX ADMIN — SODIUM CHLORIDE, SODIUM LACTATE, POTASSIUM CHLORIDE, AND CALCIUM CHLORIDE 125 ML/HR: 600; 310; 30; 20 INJECTION, SOLUTION INTRAVENOUS at 08:25

## 2018-12-20 RX ADMIN — KETAMINE HYDROCHLORIDE 50 MG: 10 INJECTION, SOLUTION INTRAMUSCULAR; INTRAVENOUS at 09:34

## 2018-12-20 RX ADMIN — Medication 5 MG: at 10:13

## 2018-12-20 RX ADMIN — FENTANYL CITRATE 200 MCG: 50 INJECTION, SOLUTION INTRAMUSCULAR; INTRAVENOUS at 09:10

## 2018-12-20 RX ADMIN — DEXMEDETOMIDINE HYDROCHLORIDE 8 MCG: 4 INJECTION, SOLUTION INTRAVENOUS at 09:38

## 2018-12-20 RX ADMIN — NYSTATIN 500000 UNITS: 100000 SUSPENSION ORAL at 08:27

## 2018-12-20 RX ADMIN — LIDOCAINE HYDROCHLORIDE 100 MG: 20 INJECTION, SOLUTION EPIDURAL; INFILTRATION; INTRACAUDAL; PERINEURAL at 09:10

## 2018-12-20 RX ADMIN — ONDANSETRON 4 MG: 2 INJECTION INTRAMUSCULAR; INTRAVENOUS at 23:32

## 2018-12-20 RX ADMIN — ENOXAPARIN SODIUM 40 MG: 40 INJECTION SUBCUTANEOUS at 20:40

## 2018-12-20 RX ADMIN — KETOROLAC TROMETHAMINE 15 MG: 30 INJECTION, SOLUTION INTRAMUSCULAR at 23:32

## 2018-12-20 RX ADMIN — ROCURONIUM BROMIDE 50 MG: 10 INJECTION, SOLUTION INTRAVENOUS at 09:10

## 2018-12-20 RX ADMIN — KETOROLAC TROMETHAMINE 15 MG: 30 INJECTION, SOLUTION INTRAMUSCULAR at 12:01

## 2018-12-20 RX ADMIN — FENTANYL CITRATE 50 MCG: 50 INJECTION, SOLUTION INTRAMUSCULAR; INTRAVENOUS at 09:04

## 2018-12-20 RX ADMIN — KETAMINE HYDROCHLORIDE 50 MG: 10 INJECTION, SOLUTION INTRAMUSCULAR; INTRAVENOUS at 09:30

## 2018-12-20 RX ADMIN — DEXMEDETOMIDINE HYDROCHLORIDE 12 MCG: 4 INJECTION, SOLUTION INTRAVENOUS at 09:34

## 2018-12-20 RX ADMIN — MIDAZOLAM HYDROCHLORIDE 2 MG: 1 INJECTION, SOLUTION INTRAMUSCULAR; INTRAVENOUS at 09:04

## 2018-12-20 RX ADMIN — MORPHINE SULFATE 8 MG: 4 INJECTION INTRAVENOUS at 15:23

## 2018-12-20 RX ADMIN — KETOROLAC TROMETHAMINE 30 MG: 30 INJECTION, SOLUTION INTRAMUSCULAR; INTRAVENOUS at 10:13

## 2018-12-20 RX ADMIN — ACETAMINOPHEN 1000 MG: 10 INJECTION, SOLUTION INTRAVENOUS at 20:40

## 2018-12-20 RX ADMIN — SODIUM CHLORIDE, SODIUM LACTATE, POTASSIUM CHLORIDE, AND CALCIUM CHLORIDE: 600; 310; 30; 20 INJECTION, SOLUTION INTRAVENOUS at 09:48

## 2018-12-20 RX ADMIN — FAMOTIDINE 20 MG: 10 INJECTION, SOLUTION INTRAVENOUS at 08:28

## 2018-12-20 RX ADMIN — SODIUM CHLORIDE, SODIUM LACTATE, POTASSIUM CHLORIDE, AND CALCIUM CHLORIDE 150 ML/HR: 600; 310; 30; 20 INJECTION, SOLUTION INTRAVENOUS at 23:33

## 2018-12-20 RX ADMIN — Medication 2 G: at 16:59

## 2018-12-20 RX ADMIN — ONDANSETRON 4 MG: 2 INJECTION INTRAMUSCULAR; INTRAVENOUS at 10:07

## 2018-12-20 RX ADMIN — SODIUM CHLORIDE, SODIUM LACTATE, POTASSIUM CHLORIDE, AND CALCIUM CHLORIDE: 600; 310; 30; 20 INJECTION, SOLUTION INTRAVENOUS at 10:23

## 2018-12-20 RX ADMIN — ENOXAPARIN SODIUM 40 MG: 40 INJECTION SUBCUTANEOUS at 08:28

## 2018-12-20 RX ADMIN — DEXMEDETOMIDINE HYDROCHLORIDE 8 MCG: 4 INJECTION, SOLUTION INTRAVENOUS at 09:41

## 2018-12-20 NOTE — PERIOP NOTES
TRANSFER - OUT REPORT:    Verbal report given to TRELL Phoenix(name) on Joselin Carls  being transferred to room 318(unit) for routine post - op       Report consisted of patients Situation, Background, Assessment and   Recommendations(SBAR). Information from the following report(s) SBAR was reviewed with the receiving nurse. Intake/Output Summary (Last 24 hours) at 12/20/2018 1103  Last data filed at 12/20/2018 1051  Gross per 24 hour   Intake 2200 ml   Output    Net 2200 ml     Lines:   Peripheral IV 12/20/18 Left;Posterior Hand (Active)   Site Assessment Clean, dry, & intact 12/20/2018 10:51 AM   Phlebitis Assessment 0 12/20/2018 10:51 AM   Infiltration Assessment 0 12/20/2018 10:51 AM   Dressing Status Clean, dry, & intact 12/20/2018 10:51 AM   Dressing Type Transparent 12/20/2018 10:51 AM   Hub Color/Line Status Green; Infusing 12/20/2018 10:51 AM        Opportunity for questions and clarification was provided.       Patient transported with:   O2 @ 2 liters

## 2018-12-20 NOTE — ROUTINE PROCESS
TRANSFER - IN REPORT:    Verbal report received from Brennon Majano RN (name) on Joselin Mcbride  being received from PACU (unit) for routine progression of care      Report consisted of patients Situation, Background, Assessment and   Recommendations(SBAR). Information from the following report(s) SBAR, Kardex, OR Summary, Intake/Output and MAR was reviewed with the receiving nurse. Opportunity for questions and clarification was provided. Assessment completed upon patients arrival to unit and care assumed.

## 2018-12-20 NOTE — ANESTHESIA POSTPROCEDURE EVALUATION
Procedure(s):  LAPAROSCOPIC GASTRIC SLEEVE,,WEDGE LIVER BIOPSY AND INTRAOPERATIVE ENDOSCOPY WITH BIOPSY.     Anesthesia Post Evaluation        Pain management: adequate  Anesthetic complications: no  Cardiovascular status: acceptable  Respiratory status: acceptable  Hydration status: acceptable  Post anesthesia nausea and vomiting:  controlled      Visit Vitals  /66   Pulse 72   Temp 36.8 °C (98.2 °F)   Resp 16   Ht 5' 4\" (1.626 m)   Wt 94.9 kg (209 lb 5 oz)   SpO2 100%   BMI 35.93 kg/m²

## 2018-12-20 NOTE — PERIOP NOTES
Reviewed PTA medication list with patient/caregiver and patient/caregiver denies any additional medications. Patient admits to having a responsible adult care for them for at least 24 hours after surgery.     Urine pregnancy results Negative and verified with A Alondra CAI .

## 2018-12-20 NOTE — INTERVAL H&P NOTE
H&P Update:  Starla Lowe was seen and examined. History and physical has been reviewed. The patient has been examined.  There have been no significant clinical changes since the completion of the originally dated History and Physical.    Signed By: Fernandez Lockwood MD     December 20, 2018 8:46 AM

## 2018-12-20 NOTE — ANESTHESIA PREPROCEDURE EVALUATION
Anesthetic History   No history of anesthetic complications            Review of Systems / Medical History  Patient summary reviewed, nursing notes reviewed and pertinent labs reviewed    Pulmonary        Sleep apnea: CPAP           Neuro/Psych   Within defined limits           Cardiovascular    Hypertension: well controlled                   GI/Hepatic/Renal     GERD: well controlled          Comments: Diet related gerd, no meds.  Endo/Other        Morbid obesity and arthritis     Other Findings              Physical Exam    Airway  Mallampati: I  TM Distance: 4 - 6 cm  Neck ROM: normal range of motion   Mouth opening: Normal     Cardiovascular    Rhythm: regular  Rate: normal         Dental    Dentition: Caps/crowns     Pulmonary  Breath sounds clear to auscultation               Abdominal  GI exam deferred       Other Findings            Anesthetic Plan    ASA: 3  Anesthesia type: general          Induction: Intravenous  Anesthetic plan and risks discussed with: Patient and Family

## 2018-12-20 NOTE — PROGRESS NOTES
PM check -      Pt awake and alert C/O expected discomfort     Mild nausea     Visit Vitals  /73   Pulse (!) 49 Comment: nurse made aware   Temp 97.9 °F (36.6 °C)   Resp 14   Ht 5' 4\" (1.626 m)   Wt 94.9 kg (209 lb 5 oz)   LMP 12/04/2018   SpO2 99%   BMI 35.93 kg/m²       MOHINDER with serosang output       Plan:  -Continue medications  -Encourage ambulation  -SCD use when in bed  -IS 10 times an hour  -NPO  -UGI and start diet in AM

## 2018-12-20 NOTE — PERIOP NOTES
Patient assigned to room 318. Ilean Harada, RN reading SBAR. Left message for family at number provided.

## 2018-12-20 NOTE — PERIOP NOTES
Patient transferred to room 318. Candie Suárez RN assuming care. Blood pressure 121/67, pulse (!) 59, temperature 97.9 °F (36.6 °C), resp. rate 14, height 5' 4\" (1.626 m), weight 94.9 kg (209 lb 5 oz), last menstrual period 12/04/2018, SpO2 98 %. Dual skin assessment completed.

## 2018-12-20 NOTE — PROGRESS NOTES
Transition of Care [facility]) Plan:   Physician follow up 12/21/18     Chart reviewed. Pt admitted for an elective surgical procedure (laparoscopic sleeve gastrectomy). Pt is independent. Please encourage ambulation. No plan of care needs identified. Anticipate pt will be medically stable for discharge within the next 24-48 hours. CM available to assist as needed. [place] Transportation:   How is patient being transported at discharge? Family/Friend      When? Once cleared by physician     Is transport scheduled? N/A      Follow-up appointment and transportation:   PCP/Specialist?  See AVS for Appointment         Who is transporting to the follow-up appointment? Self/Family/Friend      Is transport for follow up appointment scheduled? N/A    Communication plan (with patient/family): Who is being called? Patient or Next of Kin? Responsible party? Patient      What number(s) is to be used? See Facesheet      What service provider is calling for [facility] services? When are they calling? Readmission Risk? (Green/Low; Yellow/Moderate; Red/High):  Green      Care Management Interventions  PCP Verified by CM: Yes  Mode of Transport at Discharge:  Other (see comment)(Family)  Transition of Care Consult (CM Consult): Discharge Planning  Health Maintenance Reviewed: Yes  Confirm Follow Up Transport: Self  Discharge Location  Discharge Placement: Home with family assistance

## 2018-12-20 NOTE — OP NOTES
OPERATIVE REPORT         Cheyenne Willingham   : 1985  Medical Record [id number]    Pre-operative Diagnosis:  HYPERTENSION, MORBID OBESITY, GERD, BMI 40, FATTY LIVER  Post-operative Diagnosis: HYPERTENSION, MORBID OBESITY, GERD, BMI 40, FATTY LIVER  Procedure: Procedure(s): 1. LAPAROSCOPIC GASTRIC SLEEVE  2. WEDGE LIVER BIOPSY   3. INTRAOPERATIVE ENDOSCOPY WITH BIOPSY  Location: [facility]  Surgeon: Neli Hawkins MD  Assistant:  Regina Mederos [facility] - performed retraction of various structures,  assisted in creation of the   gastric sleeve, fired stapling devices, obtained hemostasis along staple lines via hemoclips, applied Eviseal,    retrieved all specimens from the abdominal cavity, closed fascial defect, and sutured incisions      Anesthesia: General       Specimens: 1. Gastric Sleeve Resection                       2. Liver Wedge Biopsy    EBL: less than 5cc  Additional Findings: none             STATEMENT OF MEDICAL NECESSITY: The patient is a 26y.o.-year-old female   who has had a history of obesity. she has failed conservative weight loss measures,   such began to consider weight loss surgical options. she chose the   sleeve gastrectomy as a means of surgical weight control. she has undergone   nutritional and psychological teaching at this time period and does wish to proceed   with sleeve gastrectomy. OPERATIVE PROCEDURE: The patient was brought to the operating room, placed   on the table in supine position at which time general  anesthesia was administered   without any difficulty. The abdomen was then prepped and draped in the   usual sterile fashion. Using a 15 blade, a 1 cm incision was made just to the   left of the umbilicus. The veress needle approach was used to gain access to   the peritoneal cavity which was then insufflated.  The Visi-Port was then placed   at that site,then 4 additional trocars were placed in the usual U-shaped   configuration with a subxiphoid incision being made to accommodate the   [facility] retractor. On entering the abdomen, the patient was noted to have a   moderately fatty liver with possible evidence of early steatohepatitis. I elevated the liver and   noted the patient had no diaphragmatic hernia present. I began the operation by choosing   an area 2-3 cm proximal to the pylorus and within the gastroepiploic vessels I began to divide   off these vessels individually. I moved cephalad toward short gastric vessels, which were   very difficult to take down due to the proximity to the splenic hilum. I was able to do so,   clearing the entire left crural area. I then placed a Visigi tube, impacting at the distal antrum. I then began the resection with the powered [place]   stapler using the green loads for the first firing tangential along the   antral region. The second and subsequent firings were used with blue  reloads  reaching just past the incisura region. The remainder of the 4 vertical   firings completed the resection at the left crural region. I then tested   the pouch via the Visigi tube using dilute methylene blue,it was noted to be completely   Watertight. I then left the operative field and proceeded to the the head of the bed and   performed an intraoperative EGD. The scope was passed successfully into the gastric   sleeve to the level of the pylorus. Biopsies were taken of this region and submitted to   test both for H Pylori and for pathologic diagnosis. There was no bleeding noted and   no leak appreciated with air insufflation. I then returned to the surgical field. I then obtained hemostasis along the staple line using   Hemoclips and sutures where needed. I then used 3 separate 2-0 Ethibond sutures to   secure the lateral aspect of the newly created sleeve stomach to the resected edge of the   gastrocolic omentum in an effort to maintain the continuity of the sleeve and prevent   twisting.  I then used   Eviseal along the entire staple line to obtain hemostasis and then place Surgicel Snow   over the staple line also. With all this having been completed, I then removed the liver   retractor. I performed a liver wedge biopsy of the left lobe of the liver due to its abnormality   and submitted to pathology for permanent section. I then placed a MOHINDER drain in the left upper   quadrant region along the staple line. I removed the specimen from the operative field   ia the LLQ incision. I closed the left lower quadrant trocar site using a transabdominal #0   PDS suture along the fascia, and all skin incisions were then closed using 4-0   subcuticular Monocryl. Steri-Strips and sterile dressings were applied. The patient tolerated the procedure well.        Tung Snow M.D.

## 2018-12-20 NOTE — PROGRESS NOTES
12:03pm   Pt alert, oriented, drowsy, but easily arousable. Denies pain. Hx of sleep apnea. Says she has CPAP at home. States family or friend will be able to bring into the hospital.   HR 50. Post op vitals stable. Scheduled toradol administered. 15:23   Morphine administered, 6/10 pain after administration. Ice pack also given to help relieve discomfort. Incentive spirometer teaching given. I have not witnessed patient use; needs encouragement. Ambulation encouraged but pt declines.    Visit Vitals  /58   Pulse 82   Temp 97.8 °F (36.6 °C)   Resp 18   Ht 5' 4\" (1.626 m)   Wt 94.9 kg (209 lb 5 oz)   SpO2 100%   BMI 35.93 kg/m²

## 2018-12-21 ENCOUNTER — APPOINTMENT (OUTPATIENT)
Dept: GENERAL RADIOLOGY | Age: 33
DRG: 621 | End: 2018-12-21
Attending: SPECIALIST
Payer: COMMERCIAL

## 2018-12-21 VITALS
BODY MASS INDEX: 37.32 KG/M2 | WEIGHT: 218.6 LBS | HEIGHT: 64 IN | HEART RATE: 78 BPM | OXYGEN SATURATION: 95 % | TEMPERATURE: 98.3 F | DIASTOLIC BLOOD PRESSURE: 88 MMHG | RESPIRATION RATE: 14 BRPM | SYSTOLIC BLOOD PRESSURE: 103 MMHG

## 2018-12-21 LAB — GLUCOSE BLD STRIP.AUTO-MCNC: 97 MG/DL (ref 70–110)

## 2018-12-21 PROCEDURE — 74011250637 HC RX REV CODE- 250/637: Performed by: SPECIALIST

## 2018-12-21 PROCEDURE — 82962 GLUCOSE BLOOD TEST: CPT

## 2018-12-21 PROCEDURE — 74011250636 HC RX REV CODE- 250/636: Performed by: SPECIALIST

## 2018-12-21 PROCEDURE — 74240 X-RAY XM UPR GI TRC 1CNTRST: CPT

## 2018-12-21 PROCEDURE — 77010033678 HC OXYGEN DAILY

## 2018-12-21 PROCEDURE — C9113 INJ PANTOPRAZOLE SODIUM, VIA: HCPCS | Performed by: SPECIALIST

## 2018-12-21 RX ORDER — OXYCODONE AND ACETAMINOPHEN 5; 325 MG/1; MG/1
1 TABLET ORAL
Status: DISCONTINUED | OUTPATIENT
Start: 2018-12-21 | End: 2018-12-21 | Stop reason: HOSPADM

## 2018-12-21 RX ORDER — OXYCODONE AND ACETAMINOPHEN 5; 325 MG/1; MG/1
1-2 TABLET ORAL
Qty: 30 TAB | Refills: 0 | Status: SHIPPED | OUTPATIENT
Start: 2018-12-21

## 2018-12-21 RX ORDER — OMEPRAZOLE 20 MG/1
20 CAPSULE, DELAYED RELEASE ORAL DAILY
Qty: 30 CAP | Refills: 0 | Status: SHIPPED | OUTPATIENT
Start: 2018-12-21 | End: 2019-01-20

## 2018-12-21 RX ADMIN — PANTOPRAZOLE SODIUM 40 MG: 40 INJECTION, POWDER, FOR SOLUTION INTRAVENOUS at 08:34

## 2018-12-21 RX ADMIN — MORPHINE SULFATE 8 MG: 4 INJECTION INTRAVENOUS at 00:06

## 2018-12-21 RX ADMIN — KETOROLAC TROMETHAMINE 15 MG: 30 INJECTION, SOLUTION INTRAMUSCULAR at 11:59

## 2018-12-21 RX ADMIN — ACETAMINOPHEN 1000 MG: 10 INJECTION, SOLUTION INTRAVENOUS at 03:20

## 2018-12-21 RX ADMIN — ENOXAPARIN SODIUM 40 MG: 40 INJECTION SUBCUTANEOUS at 08:34

## 2018-12-21 RX ADMIN — OXYCODONE AND ACETAMINOPHEN 1 TABLET: 5; 325 TABLET ORAL at 12:50

## 2018-12-21 RX ADMIN — Medication 2 G: at 00:06

## 2018-12-21 RX ADMIN — KETOROLAC TROMETHAMINE 15 MG: 30 INJECTION, SOLUTION INTRAMUSCULAR at 06:26

## 2018-12-21 RX ADMIN — MORPHINE SULFATE 8 MG: 4 INJECTION INTRAVENOUS at 08:34

## 2018-12-21 RX ADMIN — ONDANSETRON 4 MG: 2 INJECTION INTRAMUSCULAR; INTRAVENOUS at 08:34

## 2018-12-21 NOTE — PROGRESS NOTES
Problem: Falls - Risk of  Goal: *Absence of Falls  Document Lillian Fall Risk and appropriate interventions in the flowsheet.   Outcome: Progressing Towards Goal  Fall Risk Interventions:  Mobility Interventions: Patient to call before getting OOB         Medication Interventions: Patient to call before getting OOB, Teach patient to arise slowly

## 2018-12-21 NOTE — DISCHARGE SUMMARY
Discharge Summary    Patient: Stephanie Vicente               Sex: female          DOA: 12/20/2018         YOB: 1985      Age:  26 y.o.        LOS:  LOS: 1 day                Admit Date: 12/20/2018    Discharge Date: 12/21/2018    Admission Diagnoses: HYPERTENSION, MORBID OBESITY, GERD, BMI 40  Severe obesity (BMI 35.0-39. 9) with comorbidity [facility])    Discharge Diagnoses:    Problem List as of 12/21/2018 Date Reviewed: 12/20/2018          Codes Class Noted - Resolved    Severe obesity (BMI 35.0-39. 9) with comorbidity [facility]) ICD-10-CM: E66.01  ICD-9-CM: 278.01  12/20/2018 - Present        Degenerative disc disease, lumbar ICD-10-CM: M51.36  ICD-9-CM: 722.52  Unknown - Present        * (Principal) Severe obesity with body mass index (BMI) of 35.0 to 39.9 with serious comorbidity [facility]) ICD-10-CM: E66.01  ICD-9-CM: 278.01  7/6/2018 - Present        GEORGE (obstructive sleep apnea) ICD-10-CM: G47.33  ICD-9-CM: 327.23  Unknown - Present        Back pain ICD-10-CM: M54.9  ICD-9-CM: 724.5  Unknown - Present        Edema extremities ICD-10-CM: R60.0  ICD-9-CM: 782. 3  Unknown - Present        GERD (gastroesophageal reflux disease) ICD-10-CM: K21.9  ICD-9-CM: 530.81  Unknown - Present        ADHD (attention deficit hyperactivity disorder) ICD-10-CM: F90.9  ICD-9-CM: 314.01  Unknown - Present        Anxiety ICD-10-CM: F41.9  ICD-9-CM: 300.00  Unknown - Present        Hypertension ICD-10-CM: I10  ICD-9-CM: 401.9  Unknown - Present        RESOLVED: Morbid obesity ([address] ICD-10-CM: E66.01  ICD-9-CM: 278.01  Unknown - 12/10/2018        RESOLVED: BMI 38.0-38.9,adult ICD-10-CM: R29.20  ICD-9-CM: V85.38  Unknown - 12/10/2018        RESOLVED: Morbid obesity with BMI of 40.0-44.9, adult ([facility] ICD-10-CM: E66.01, Z68.41  ICD-9-CM: 278.01, V85.41  Unknown - 12/10/2018              Discharge Condition: Good    Hospital Course: The patient underwent  laparoscopic sleeve gastrectomy  on 12/20/2018.  The patient tolerated the procedure well. Vital signs remained stable and the patient was transferred to  3rd floor surgical unit without complications. The patient remained stable throughout the first night post operatively with stable vital signs and adequate urine output and pain control. Pain was controlled with Morphine IV and IV Tylenol . The patient on the first morning post operative was transferred to the radiology suite where they underwent a gastrograffin UGI study which showed no evidence of a leak or stricture. The drain was discontinued on POD # 1 and the patient was started on a bariatric liquid diet with protein shakes. The patient progressed throughout the day and was ambulating well and tolerating their diet. They were therefore discharged home with instructions to notify me with any issues that may arise. Significant Diagnostic Studies:   No results for input(s): HGB, HGBEXT in the last 72 hours. No results for input(s): HCT, HCTEXT in the last 72 hours. Current Discharge Medication List      CONTINUE these medications which have CHANGED    Details   oxyCODONE-acetaminophen (PERCOCET) 5-325 mg per tablet Take 1-2 Tabs by mouth every six (6) hours as needed for Pain. Max Daily Amount: 8 Tabs. Qty: 30 Tab, Refills: 0    Associated Diagnoses: Post-operative pain      omeprazole (PRILOSEC) 20 mg capsule Take 1 Cap by mouth daily for 30 days. OTC  Qty: 30 Cap, Refills: 0    Associated Diagnoses: S/P bariatric surgery         CONTINUE these medications which have NOT CHANGED    Details   dextroamphetamine-amphetamine (ADDERALL) 30 mg tablet Take 30 mg by mouth. STOP taking these medications       hydroCHLOROthiazide (HYDRODIURIL) 12.5 mg tablet Comments:   Reason for Stopping:               Activity: activity as tolerated with no heavy lifting of greater than 20 pounds. No anti- inflammatory medications. Use stool softeners at home as needed while taking pain medications since they are constipating.     Diet: Bariatric liquid diet    Wound Care: Keep wound clean and dry, Reinforce dressing PRN and ice to area for comfort. Do not get wound wet for 2 days.     Follow-up: 14 days with Dr Sebastián Heard M.D

## 2018-12-21 NOTE — ROUTINE PROCESS
Bedside and Verbal shift change report given to COLT Santiago (oncoming nurse) by Lora Seo RN   (offgoing nurse). Report included the following information SBAR, Kardex, Procedure Summary, Intake/Output and MAR.

## 2018-12-21 NOTE — PROGRESS NOTES
Problem: Falls - Risk of  Goal: *Absence of Falls  Document Lillian Fall Risk and appropriate interventions in the flowsheet.   Outcome: Progressing Towards Goal  Fall Risk Interventions:            Medication Interventions: Assess postural VS orthostatic hypotension, Evaluate medications/consider consulting pharmacy

## 2018-12-21 NOTE — ROUTINE PROCESS
Bedside shift change report given to TYSON Chadwick RN (oncoming nurse) by TIMOTHY Boston (offgoing nurse). Report included the following information SBAR, Kardex, Intake/Output and MAR.

## 2018-12-21 NOTE — PROGRESS NOTES
Shift summary: Pt asked to drink water and eat a popsicle. Informed that she can only have ice chips until the upper GI XR. Pt verbalized understanding. Abdominal lap sites w/ dressings CDI. MOHINDER drain with minimal serosanguinous output. Ambulated hallways, tolerated well. Morphine given for abdominal pain.      Patient Vitals for the past 12 hrs:   Temp Pulse Resp BP SpO2   12/21/18 0320 98.6 °F (37 °C) 74 16 121/69 93 %   12/20/18 2354 98.3 °F (36.8 °C) (!) 56 18 139/67 95 %   12/20/18 2032 98.1 °F (36.7 °C) 73 16 116/65 96 %

## 2018-12-21 NOTE — PROGRESS NOTES
Bariatric Surgery                POD #1    Visit Vitals  /70 (BP 1 Location: Right arm, BP Patient Position: At rest)   Pulse 71   Temp 98 °F (36.7 °C)   Resp 16   Ht 5' 4\" (1.626 m)   Wt 99.2 kg (218 lb 9.6 oz)   LMP 12/04/2018   SpO2 91%   BMI 37.52 kg/m²     Patient has minimal complaints of pain, minimal nausea noted     Exam:  Appears well in no distress  Lungs- clear bilaterally  Abd - soft, incisions look good without erythema           MOHINDER with minimal serosanguinous output  Extremities- no new edema or swelling    UGI - pending    Data Review:    Labs: Results:       Chemistry No results for input(s): GLU, NA, K, CL, CO2, BUN, CREA, CA, AGAP, BUCR, TBIL, GPT, AP, TP, ALB, GLOB, AGRAT in the last 72 hours. CBC w/Diff No results for input(s): WBC, RBC, HGB, HCT, PLT, GRANS, LYMPH, EOS, RETIC, HGBEXT, HCTEXT, PLTEXT in the last 72 hours. Coagulation No results for input(s): PTP, INR, APTT in the last 72 hours. No lab exists for component: INREXT    Liver Enzymes No results for input(s): TP, ALB, TBIL, AP, SGOT, GPT in the last 72 hours. No lab exists for component: DBIL       Assessment/Plan: S/P  laparoscopic sleeve gastrectomy - doing well without any issues    Orders are pending until UGI study shows normal anatomy. 1.Start bariatric diet and protein shakes  2. D/C IV pain meds and start PO pain meds  3. D/C MOHINDER drain  4. Likely PM D/C if continues to be okay and tolerates PO

## 2018-12-21 NOTE — PROGRESS NOTES
Discharge instructions explained and understood by the patient. IV discontinued, no redness, swelling or pain noted. Pt discharged off the unit walking with [facility] transportation. Patient armband removed and shredded.

## 2018-12-21 NOTE — DISCHARGE INSTRUCTIONS
[facility]  Dung Lopez. Stormy Fuentes M.D., .A.C.[address], [address] [facility]  Office: [phone]    Fax:  [phone]    Discharge Instruction for Sleeve Gastrectomy Patients    Diet:   Continue with the liquid diet until you are seen in the office. Make sure you sip fluids all day. Goal for total fluid intake is a minimum of 64 ounces per day.  Aim for  Grams of protein every day. Occasionally protein shakes with whey protein will cause diarrhea after surgery due to newly developed lactose intolerance. If you experience this, there are other protein drinks on the market that do not use milk proteins such as whey. Activity:   Get up and walk at least once an hour during normal waking hours.  Walk a minimum of 30 minutes every day for exercise. Rest when you are tired.  Use your Incentive Spirometry (plastic breathing machine) 10 times every hour for two weeks. Take a slow steady breath in until you can not inhale anymore.  You may shower. No baths, hot tubs or swimming.  You may climb stairs. Take your time.  No lifting more than 10-15 lbs.  If you feel discomfort during an activity, rest.   Do not drive for 1 week or until you are off of all narcotics. Wound Care:   Clean incisions with soap and water when in the shower. Pat dry.  Leave steri-strips on until they fall off.  A small amount of drainage may be present from the incisions. Contact the office if you notice an increase in drainage, an odor, increased redness, or fever > 100.5. Medications:   You should have received a prescription for pain medication and Prilosec prior to surgery, if not, notify Dr. Jagruti Gallegos team.   Sonja Kahn every day until your prescription runs out. It helps the healing process and helps prevent you from having the hiccups and acid reflux.     For upset stomach you may take over the counter medications such as Maalox, Mylanta, or Pepto Bismol.  Gas X works very well for bloating when eating or drinking.  You may take Tylenol. Do not exceed 4,000mg of Tylenol in one day. Percocet has Tylenol in it, you will need to consider this when taking Tylenol and Percocet together.  Non-aspirin based arthritis medications may be resumed.  Take a childrens or adult chewable multivitamin.  Milk of Magnesia will provide immediate relief of constipation (not for daily use). Daily use of Miralax or Benefiber may be needed if you develop chronic constipation.  It is fine to take your usual home medications. Blood pressure medications should be continued after surgery, unless it is a diuretic. Diabetic medications can frequently be reduced very quickly after surgery. Diabetics should continue to monitor blood sugars frequently after surgery and contact the prescribing physician for any questions. Follow-Up Appointment:   If you do not already have a follow-up appointment scheduled, contact the office in the next few days to obtain one. It is usually scheduled for 10-14 days after your surgery date. Office phone number:  [phone].  Call our office if you have questions, concerns or any worsening of condition. If you are not able to reach us, go to your primary care provider or the Emergency Department.

## 2018-12-21 NOTE — PROGRESS NOTES
CM met with pt at bedside to discuss transition of care. Pt lives alone and has family in the area to assist as needed. No transition of care needs have been identified. Pt is independent and does not have or use DME. .Care Management Interventions  PCP Verified by CM: Yes  Mode of Transport at Discharge:  Other (see comment)(Family)  Transition of Care Consult (CM Consult): Discharge Planning  Health Maintenance Reviewed: Yes  Current Support Network: Family Lives [place], Lives Alone  Confirm Follow Up Transport: Self  Plan discussed with Pt/Family/Caregiver: Yes  Discharge Location  Discharge Placement: Home with family assistance

## 2018-12-21 NOTE — PROGRESS NOTES
Bariatric Surgery                POD #1    Visit Vitals  /88 (BP 1 Location: Right arm, BP Patient Position: At rest)   Pulse 78   Temp 98.3 °F (36.8 °C)   Resp 14   Ht 5' 4\" (1.626 m)   Wt 99.2 kg (218 lb 9.6 oz)   LMP 12/04/2018   SpO2 95%   BMI 37.52 kg/m²     Patient has minimal complaints of pain, minimal nausea noted     Exam:  Appears well in no distress  Lungs- clear bilaterally  Abd - soft, incisions look good without erythema           MOHINDER with minimal serosanguinous output  Extremities- no new edema or swelling    UGI - no obstrustion or leak    Data Review:    Labs: Results:       Chemistry No results for input(s): GLU, NA, K, CL, CO2, BUN, CREA, CA, AGAP, BUCR, TBIL, GPT, AP, TP, ALB, GLOB, AGRAT in the last 72 hours. CBC w/Diff No results for input(s): WBC, RBC, HGB, HCT, PLT, GRANS, LYMPH, EOS, RETIC, HGBEXT, HCTEXT, PLTEXT in the last 72 hours. Coagulation No results for input(s): PTP, INR, APTT in the last 72 hours. No lab exists for component: INREXT    Liver Enzymes No results for input(s): TP, ALB, TBIL, AP, SGOT, GPT in the last 72 hours. No lab exists for component: DBIL       Assessment/Plan: S/P  laparoscopic sleeve gastrectomy - doing well without any issues    1. Start bariatric diet and protein shakes  2. D/C IV pain meds and start PO pain meds  3. D/C MOHINDER drain  4. Likley PM D/C if  Cont ok and tolerate PO

## 2018-12-27 ENCOUNTER — TELEPHONE (OUTPATIENT)
Dept: SURGERY | Age: 33
End: 2018-12-27

## 2018-12-27 NOTE — TELEPHONE ENCOUNTER
Spoke with patient after discharge from [facility] 12/20. Feeling well. Walking around house. Using incentive spirometer. Tolerating sips of water and protein shakes without difficulty. Post op pain controlled with pain medication as needed. Having expected LLQ incisional pain. Denies fevers, chills. Incision site without redness, drainage, swelling. Told patient to call office if any change. 2 week postop visit scheduled. GRETA Morris, HO

## 2018-12-31 ENCOUNTER — TELEPHONE (OUTPATIENT)
Dept: SURGERY | Age: 33
End: 2018-12-31

## 2018-12-31 NOTE — TELEPHONE ENCOUNTER
Pt called and stated that she needs more pain medication. She stated that [facility] pain management would not give it to her until she is released from Dr Jaylon Clark. Per Dr Jaylon Clark to fax over to [facility] saying that pt is released from our postoperative pain management. I did so, and I called patient and she is aware.

## 2019-01-03 ENCOUNTER — OFFICE VISIT (OUTPATIENT)
Dept: SURGERY | Age: 34
End: 2019-01-03

## 2019-01-03 VITALS
BODY MASS INDEX: 33.41 KG/M2 | OXYGEN SATURATION: 100 % | HEART RATE: 74 BPM | SYSTOLIC BLOOD PRESSURE: 122 MMHG | WEIGHT: 195.7 LBS | TEMPERATURE: 98 F | HEIGHT: 64 IN | DIASTOLIC BLOOD PRESSURE: 65 MMHG

## 2019-01-03 DIAGNOSIS — Z98.84 S/P LAPAROSCOPIC SLEEVE GASTRECTOMY: ICD-10-CM

## 2019-01-03 DIAGNOSIS — K90.9 INTESTINAL MALABSORPTION, UNSPECIFIED TYPE: Primary | ICD-10-CM

## 2019-01-03 PROBLEM — E66.01 SEVERE OBESITY (BMI 35.0-39.9) WITH COMORBIDITY (HCC): Status: RESOLVED | Noted: 2018-12-20 | Resolved: 2019-01-03

## 2019-01-03 PROBLEM — E66.01 SEVERE OBESITY WITH BODY MASS INDEX (BMI) OF 35.0 TO 39.9 WITH SERIOUS COMORBIDITY (HCC): Status: RESOLVED | Noted: 2018-07-06 | Resolved: 2019-01-03

## 2019-01-03 RX ORDER — CYANOCOBALAMIN 1000 UG/ML
1000 INJECTION, SOLUTION INTRAMUSCULAR; SUBCUTANEOUS
Qty: 3 VIAL | Refills: 3 | Status: SHIPPED | OUTPATIENT
Start: 2019-01-03 | End: 2019-06-12 | Stop reason: SDUPTHER

## 2019-01-03 NOTE — PROGRESS NOTES
Subjective:      Augustine Almendarez is a 26 y.o. female is now 2 weeks status post laparoscopic sleeve gastrectomy. Doing well overall. She has lost a total of 19 pounds since surgery. Body mass index is 33.59 kg/m². Currently on a liquid diet without difficulty. Taking in 60-70 oz water daily. Sources of protein include protein shakes and broth. 30-40  min of activity 3 days a week, including walking. Bowel movements are regular. Pain is controlled without any medications. Having expected LLQ incisional pain. The patient is compliant with multivitamins. Surgery related complications: NA.  Liver bx report reviewed with patient. Stomach bx report reviewed with patient. Weight Loss Metrics 1/3/2019 12/20/2018 12/18/2018 12/10/2018 12/3/2018 11/1/2018 10/4/2018   Today's Wt 195 lb 11.2 oz 218 lb 9.6 oz 214 lb 214 lb 3.2 oz 214 lb 218 lb 222 lb   BMI 33.59 kg/m2 37.52 kg/m2 36.73 kg/m2 36.77 kg/m2 36.73 kg/m2 37.42 kg/m2 38.11 kg/m2          Comorbidities:    Hypertension: improved, no medications, reports edema resolved  Diabetes: not applicable  Obstructive Sleep Apnea: improved, not using CPAP  Hyperlipidemia: not applicable  Stress Urinary Incontinence: not applicable  Gastroesophageal Reflux: improved, on PPI  Weight related arthropathy:unchanged, chronic back pain     Patient Active Problem List   Diagnosis Code    Severe obesity with body mass index (BMI) of 35.0 to 39.9 with serious comorbidity (HCC) E66.01    GEORGE (obstructive sleep apnea) G47.33    Back pain M54.9    Edema extremities R60.0    GERD (gastroesophageal reflux disease) K21.9    ADHD (attention deficit hyperactivity disorder) F90.9    Anxiety F41.9    Hypertension I10    Degenerative disc disease, lumbar M51.36    Severe obesity (BMI 35.0-39. 9) with comorbidity ([facility] E66.01    Intestinal malabsorption K90.9    S/P laparoscopic sleeve gastrectomy Z98.84        Past Medical History:   Diagnosis Date    ADHD (attention deficit hyperactivity disorder)     Anxiety     Back pain     Degenerative disc disease, lumbar     Edema extremities     GERD (gastroesophageal reflux disease)     Hypertension     Morbid [facility]     Morbid obesity with BMI of 40.0-44.9, adult ([facility]     GEORGE (obstructive sleep apnea)     use machine       Past Surgical History:   Procedure Laterality Date    HX DILATION AND CURETTAGE      HX WISDOM TEETH EXTRACTION      GA LAP, BALTAZAR RESTRICT PROC, LONGITUDINAL GASTRECTOMY  12/20/2018    Dr. Mychal Harris       Current Outpatient Medications   Medication Sig Dispense Refill    oxyCODONE-acetaminophen (PERCOCET) 5-325 mg per tablet Take 1-2 Tabs by mouth every six (6) hours as needed for Pain. Max Daily Amount: 8 Tabs. 30 Tab 0    omeprazole (PRILOSEC) 20 mg capsule Take 1 Cap by mouth daily for 30 days. OTC 30 Cap 0    dextroamphetamine-amphetamine (ADDERALL) 30 mg tablet Take 30 mg by mouth.          Allergies   Allergen Reactions    Hydrocodone-Acetaminophen Nausea and Vomiting     Projectile vomiting       Review of Symptoms:       General - No history or complaints of unexpected fever or chills  Head/Neck - No history or complaints of headache or dizziness  Cardiac - No history or complaints of chest pain, palpitations, or shortness of breath  Pulmonary - No history or complaints of shortness of breath or productive cough  Gastrointestinal - as noted above  Genitourinary - No history or complaints of hematuria/dysuria or renal lithiasis  Musculoskeletal - No history or complaints of joint  muscular weakness  Hematologic - No history of any bleeding episodes  Neurologic - No history or complaints of  migraine headaches or neurologic symptoms        Objective:     Visit Vitals   5' 4\" (1.626 m)   Wt 88.8 kg (195 lb 11.2 oz)   LMP 12/04/2018   BMI 33.59 kg/m²       General:  alert, cooperative, no distress, appears stated age   Chest: lungs clear to auscultation, breath sounds equal and symmetric, no rhonchi, rales or wheezes, no accessory muscle use   Cor:   Regular rate and rhythm, S1S2 present or without murmur or extra heart sounds   Abdomen: soft, bowel sounds active, non-tender, no masses or organomegaly   Incisions:   healing well, no drainage, no erythema, no hernia, no seroma, no swelling, no dehiscence, incision well approximated       Assessment:   History of Morbid obesity, status post laparoscopic sleeve gastrectomy. Doing well postoperatively. HTN - off Rx, f/u PCP  GEORGE - not using CPAP, f/u PCP  GERD - continue PPI    Plan:     1. Increase activity to the goal of 30 minutes daily  2. Advance diet to soft solid phase. Reminded to measure portions, continue high protein, low carbohydrate diet. Reminded to eat regularly, to eat slowly & not to drink with meals. Refer to the handbook given in class. 3. Continue multivitamin   4. Continue current medications and follow up with PCP for management of regimen. 5. Encouraged to attend support group   6. I have discussed this plan with patient and they verbalized understanding  7. Follow up in 2 weeks or sooner if patient has questions, concerns or worsening of condition, if unable to reach our office, patient should report to the ED. 8. Ms. Donato All has a reminder for a \"due or due soon\" health maintenance. I have asked that she contact her primary care provider for a follow-up on this health maintenance.

## 2019-01-03 NOTE — PATIENT INSTRUCTIONS
Continue focus on hydration. May advance to soft diet. Please review material in your binder. Remember - your motto is \"soft foods with protein\". Eat regularly. Continue to use protein shakes. Remember to eat slowly & not to drink fluids with your meals. Increase activity as able - cardio (walking) only. Continue to take multi-vitamins. Continue regular follow-up with your PCP. Return to office in 2 weeks for your next appointment. Call the office if you have any questions or concerns. Walking for Exercise: Care Instructions  Your Care Instructions    Walking is one of the easiest ways to get the exercise you need for good health. A brisk, 30-minute walk each day can help you feel better and have more energy. It can help you lower your risk of disease. Walking can help you keep your bones strong and your heart healthy. Check with your doctor before you start a walking plan if you have heart problems, other health issues, or you have not been active in a long time. Follow your doctor's instructions for safe levels of exercise. Follow-up care is a key part of your treatment and safety. Be sure to make and go to all appointments, and call your doctor if you are having problems. It's also a good idea to know your test results and keep a list of the medicines you take. How can you care for yourself at home? Getting started  · Start slowly and set a short-term goal. For example, walk for 5 or 10 minutes every day. · Bit by bit, increase the amount you walk every day. Try for at least 30 minutes on most days of the week. You also may want to swim, bike, or do other activities. · If finding enough time is a problem, it is fine to be active in blocks of 10 minutes or more throughout your day and week. · To get the heart-healthy benefits of walking, you need to walk briskly enough to increase your heart rate and breathing, but not so fast that you cannot talk comfortably.   · Wear comfortable shoes that fit well and provide good support for your feet and ankles. Staying with your plan  · After you've made walking a habit, set a longer-term goal. You may want to set a goal of walking briskly for longer or walking farther. Experts say to do 2½ hours of moderate activity a week. A faster heartbeat is what defines moderate-level activity. · To stay motivated, walk with friends, coworkers, or pets. · Use a phone cindi or pedometer to track your steps each day. Set a goal to increase your steps. Once you get there, set a higher goal. Aim for 10,000 steps a day. · If the weather keeps you from walking outside, go for walks at the mall with a friend. Local schools and churches may have indoor gyms where you can walk. Fitting a walk into your workday  · Park several blocks away from work, or get off the bus a few stops early. · Use the stairs instead of the elevator, at least for a few floors. · Suggest holding meetings with colleagues during a walk inside or outside the building. · Use the restroom that is the farthest from your desk or workstation. · Use your morning and afternoon breaks to take quick 15-minute walks. Staying safe  · Know your surroundings. Walk in a well-lighted, safe place. If it is dark, walk with a partner. Wear light-colored clothing. If you can, buy a vest or jacket that reflects light. · Carry a cell phone for emergencies. · Drink plenty of water. Take a water bottle with you when you walk. This is very important if it is hot out. · Be careful not to slip on wet or icy ground. You can buy \"grippers\" for your shoes to help keep you from slipping. · Pay attention to your walking surface. Use sidewalks and paths. · If you have breathing problems like asthma or COPD, ask your doctor when it is safe for you to walk outdoors. Cold, dry air, smog, pollen, or other things in the air could cause breathing problems. Where can you learn more?   Go to http://desire-homar.info/. Enter R159 in the search box to learn more about \"Walking for Exercise: Care Instructions. \"  Current as of: December 7, 2017  Content Version: 11.8  © 2849-8960 [employer], [place]. Care instructions adapted under license by [employer] (which disclaims liability or warranty for this information). If you have questions about a medical condition or this instruction, always ask your healthcare professional. [place] [address] any warranty or liability for your use of this information.

## 2019-01-22 ENCOUNTER — OFFICE VISIT (OUTPATIENT)
Dept: SURGERY | Age: 34
End: 2019-01-22

## 2019-01-22 VITALS
WEIGHT: 189 LBS | SYSTOLIC BLOOD PRESSURE: 120 MMHG | OXYGEN SATURATION: 100 % | HEART RATE: 60 BPM | BODY MASS INDEX: 32.27 KG/M2 | DIASTOLIC BLOOD PRESSURE: 67 MMHG | TEMPERATURE: 98 F | HEIGHT: 64 IN

## 2019-01-22 VITALS — WEIGHT: 189 LBS | HEIGHT: 64 IN | BODY MASS INDEX: 32.27 KG/M2

## 2019-01-22 DIAGNOSIS — Z98.84 S/P LAPAROSCOPIC SLEEVE GASTRECTOMY: ICD-10-CM

## 2019-01-22 DIAGNOSIS — G47.33 OSA (OBSTRUCTIVE SLEEP APNEA): ICD-10-CM

## 2019-01-22 DIAGNOSIS — K90.9 INTESTINAL MALABSORPTION, UNSPECIFIED TYPE: Primary | ICD-10-CM

## 2019-01-22 DIAGNOSIS — I10 HYPERTENSION, UNSPECIFIED TYPE: ICD-10-CM

## 2019-01-22 DIAGNOSIS — Z98.84 S/P LAPAROSCOPIC SLEEVE GASTRECTOMY: Primary | ICD-10-CM

## 2019-01-22 RX ORDER — URSODIOL 500 MG/1
500 TABLET, FILM COATED ORAL DAILY
Qty: 30 TAB | Refills: 4 | Status: SHIPPED | OUTPATIENT
Start: 2019-01-22 | End: 2019-02-21

## 2019-01-22 NOTE — PATIENT INSTRUCTIONS
May advance diet to solid phase. Remember to measure portions, to keep the focus on increasing your protein & keeping your carbs low. Continue the use of protein shakes. To follow recommendations of dietician. Stay hydrated! Eat regularly, eat slowly & do not drink with your meals. Vitamins to be taken include your multivitamin, B12, calcium citrate, Vit D & Bcomplex. Refer to your notebook & handouts for dosages. Continue to increase cardio activity. May add resistance exercises. Continue follow-up with your PCP. Return to this office in 1 month. Call if questions/concerns prior to your office visit. Walking for Exercise: Care Instructions Your Care Instructions Walking is one of the easiest ways to get the exercise you need for good health. A brisk, 30-minute walk each day can help you feel better and have more energy. It can help you lower your risk of disease. Walking can help you keep your bones strong and your heart healthy. Check with your doctor before you start a walking plan if you have heart problems, other health issues, or you have not been active in a long time. Follow your doctor's instructions for safe levels of exercise. Follow-up care is a key part of your treatment and safety. Be sure to make and go to all appointments, and call your doctor if you are having problems. It's also a good idea to know your test results and keep a list of the medicines you take. How can you care for yourself at home? Getting started · Start slowly and set a short-term goal. For example, walk for 5 or 10 minutes every day. · Bit by bit, increase the amount you walk every day. Try for at least 30 minutes on most days of the week. You also may want to swim, bike, or do other activities. · If finding enough time is a problem, it is fine to be active in blocks of 10 minutes or more throughout your day and week.  
· To get the heart-healthy benefits of walking, you need to walk briskly enough to increase your heart rate and breathing, but not so fast that you cannot talk comfortably. · Wear comfortable shoes that fit well and provide good support for your feet and ankles. Staying with your plan · After you've made walking a habit, set a longer-term goal. You may want to set a goal of walking briskly for longer or walking farther. Experts say to do 2½ hours of moderate activity a week. A faster heartbeat is what defines moderate-level activity. · To stay motivated, walk with friends, coworkers, or pets. · Use a phone cindi or pedometer to track your steps each day. Set a goal to increase your steps. Once you get there, set a higher goal. Aim for 10,000 steps a day. · If the weather keeps you from walking outside, go for walks at the mall with a friend. Local schools and churches may have indoor gyms where you can walk. Fitting a walk into your workday · Park several blocks away from work, or get off the bus a few stops early. · Use the stairs instead of the elevator, at least for a few floors. · Suggest holding meetings with colleagues during a walk inside or outside the building. · Use the restroom that is the farthest from your desk or workstation. · Use your morning and afternoon breaks to take quick 15-minute walks. Staying safe · Know your surroundings. Walk in a well-lighted, safe place. If it is dark, walk with a partner. Wear light-colored clothing. If you can, buy a vest or jacket that reflects light. · Carry a cell phone for emergencies. · Drink plenty of water. Take a water bottle with you when you walk. This is very important if it is hot out. · Be careful not to slip on wet or icy ground. You can buy \"grippers\" for your shoes to help keep you from slipping. · Pay attention to your walking surface. Use sidewalks and paths. · If you have breathing problems like asthma or COPD, ask your doctor when it is safe for you to walk outdoors.  Cold, dry air, smog, pollen, or other things in the air could cause breathing problems. Where can you learn more? Go to http://desire-homar.info/. Enter R159 in the search box to learn more about \"Walking for Exercise: Care Instructions. \" Current as of: December 7, 2017 Content Version: 11.8 © 5765-7050 [employer]. Care instructions adapted under license by [employer] (which disclaims liability or warranty for this information). If you have questions about a medical condition or this instruction, always ask your healthcare professional. [address] any warranty or liability for your use of this information.

## 2019-01-22 NOTE — PROGRESS NOTES
Subjective:  
  
Erik Cueto is a 26 y.o. female is now 1 months status post laparoscopic sleeve gastrectomy. Doing well overall. She has lost a total of 25 pounds since surgery. Body mass index is 32.44 kg/m². Has lost 30% of EBW. Currently on a soft food diet without difficulty, reports nausea with vitamins and denies vomiting, abdominal pain and diarrhea. Taking in 70oz water daily. Sources of protein include chicken, cheese, yogurt. 120-240 min of activity 4 days a week, including dancing. Is noticing after questioning extreme fatigue and ravenous hunger following days. Bowel movements are alternating diarrhea and regularity, some improvement after switching protein products. Pain is controlled without any medications. Still has rare LLQ positional incisional pain. The patient is compliant with multivitamin. Weight Loss Metrics 1/22/2019 1/22/2019 1/3/2019 12/20/2018 12/18/2018 12/10/2018 12/3/2018 Today's Wt 189 lb 189 lb 195 lb 11.2 oz 218 lb 9.6 oz 214 lb 214 lb 3.2 oz 214 lb BMI 32.44 kg/m2 32.44 kg/m2 33.59 kg/m2 37.52 kg/m2 36.73 kg/m2 36.77 kg/m2 36.73 kg/m2 Comorbidities: Hypertension: improved, no medications, reports edema resolved Diabetes: not applicable Obstructive Sleep Apnea: resolved, not using CPAP Hyperlipidemia: not applicable Stress Urinary Incontinence: not applicable0 Gastroesophageal Reflux: improved, on PPI Weight related arthropathy:unchanged, chronic back pain Patient Active Problem List  
Diagnosis Code  GEORGE (obstructive sleep apnea) G47.33  
 Back pain M54.9  Edema extremities R60.0  GERD (gastroesophageal reflux disease) K21.9  ADHD (attention deficit hyperactivity disorder) F90.9  Anxiety F41.9  Hypertension I10  Degenerative disc disease, lumbar M51.36  
 Intestinal malabsorption K90.9  S/P laparoscopic sleeve gastrectomy Z98.84 Past Medical History:  
Diagnosis Date  ADHD (attention deficit hyperactivity disorder)  Anxiety  Back pain  Degenerative disc disease, lumbar  Edema extremities  GERD (gastroesophageal reflux disease)  Hypertension  Morbid obesity ([facility] [address]  Morbid obesity with BMI of 40.0-44.9, adult ([facility] [address]  GEORGE (obstructive sleep apnea)   
 use machine Past Surgical History:  
Procedure Laterality Date  HX DILATION AND CURETTAGE    
 HX WISDOM TEETH EXTRACTION    
 CT LAP, BALTAZAR RESTRICT PROC, LONGITUDINAL GASTRECTOMY  12/20/2018 Dr. Negrito Mei Current Outpatient Medications Medication Sig Dispense Refill  cyanocobalamin (VITAMIN B-12) 1,000 mcg/mL injection 1 mL by SubCUTAneous route every thirty (30) days. 3 Vial 3  Syringe with Needle, Safety (3CC SAFETY SYRINGE 25GX5/8\") 3 mL 25 gauge x 5/8\" syrg 1 mL by Does Not Apply route every thirty (30) days. 3 Pen Needle 3  
 multivitamin with iron (FLINTSTONES) chewable tablet Take 1 Tab by mouth daily.  oxyCODONE-acetaminophen (PERCOCET) 5-325 mg per tablet Take 1-2 Tabs by mouth every six (6) hours as needed for Pain. Max Daily Amount: 8 Tabs. 30 Tab 0  
 dextroamphetamine-amphetamine (ADDERALL) 30 mg tablet Take 30 mg by mouth. Allergies Allergen Reactions  Hydrocodone-Acetaminophen Nausea and Vomiting Projectile vomiting Review of Symptoms:  
 
 
General - No history or complaints of unexpected fever or chills Head/Neck - No history or complaints of headache or dizziness Cardiac - No history or complaints of chest pain, palpitations, or shortness of breath Pulmonary - No history or complaints of shortness of breath or productive cough Gastrointestinal - as noted above Genitourinary - No history or complaints of hematuria/dysuria or renal lithiasis Musculoskeletal - No history or complaints of joint  muscular weakness Hematologic - No history of any bleeding episodes Neurologic - No history or complaints of  migraine headaches or neurologic symptoms Objective:  
 
Visit Vitals Ht 5' 4\" (1.626 m) Wt 85.7 kg (189 lb) BMI 32.44 kg/m² General:  alert, cooperative, no distress, appears stated age Chest: lungs clear to auscultation, breath sounds equal and symmetric, no rhonchi, rales or wheezes, no accessory muscle use Cor:   Regular rate and rhythm, S1S2 present or without murmur or extra heart sounds Abdomen: soft, bowel sounds active, non-tender, no masses or organomegaly Incisions:   healing well, no drainage, no erythema, no hernia, no seroma, no swelling, no dehiscence, incision well approximated Assessment:  
History of Morbid obesity, status post laparoscopic sleeve gastrectomy. Doing well postoperatively. HTN - off Rx, f/u PCP 
GEORGE - off CPAP, f/u pulmonology Plan:  
 
1. Increase calories to at least 1200 a day with amount of exercise and consider decreasing cardio unitl noticing less fatigue and hunger. 2. Start 500mg Janet Forte today, stop after 6 months out from surgery. 3. Patient is cleared to return to work without restrictions. 4. Can stop probiotic 5. Advance diet to solid phase. Reminded to measure portions, continue high protein, low carbohydrate diet. Reminded to eat regularly, to eat slowly & not to drink with meals. Refer to the handbook given in class. 6. Patient has appt with dietician directly after this visit. 7. Continue multivitamin and add the additional vitamin supplementation (Ca, B complex, B12, D, all listed in handbook) 8. Continue current medications and follow up with PCP for management of regimen. 9. Continue cardio exercise and add resistance exercises. Discussed with patient. Minimum of 30 minutes of exercise daily. 10. Encouraged to attend support group 11. I have discussed this plan with patient and they verbalized understanding 12. Follow up in 4 weeks or sooner if patient has questions, concerns or worsening of condition, if unable to reach our office, patient should report to the ED. 15. Ms. Myron Chen has a reminder for a \"due or due soon\" health maintenance. I have asked that she contact her primary care provider for a follow-up on this health maintenance.

## 2019-01-22 NOTE — PROGRESS NOTES
Pt given one on one diet education. Appears to have a good understanding of the diet progression, food choices, and dietary/exercise habits for successful weight loss and nourishment one month after surgery. The  material included: post-op diet progression and portion sizes (including low fat, low sugar food recommendations and emphasis on protein foods and protein supplements), good beverage choices, reading a food label, vitamins/minerals required after weight loss surgery, and encouraging dietary and exercise habits that lead to weight loss success. Pt also received a restaurant card, which tells restaurants that the patient had a procedure that decreases the size of their stomach so the restaurant may let them order off the children's menu, the senior's menu, or a smaller portion for a reduced rate.      Maikel Celis

## 2019-02-25 ENCOUNTER — TELEPHONE (OUTPATIENT)
Dept: SURGERY | Age: 34
End: 2019-02-25

## 2019-02-25 NOTE — TELEPHONE ENCOUNTER
Dr. Jeremiah Collins please advise:    Patient c/o abdominal pain and bright red blood in stools. Denied any usage of blood thinners, NSAIDs or any medication or diet changes. Denied any nausea or vomiting. When asked about abdominal pain X 2. Patient stated she was having pain but \"did not pay attention too it\". Upon assessment described pain as \"sharp and piercing\". Advised patient to seek emergent care if symptoms worsen and office visit scheduled in this office for symptoms. Patient verbalized understanding. Pt requested appointment with Dr. Jeremiah Collins.

## 2019-02-27 ENCOUNTER — OFFICE VISIT (OUTPATIENT)
Dept: SURGERY | Age: 34
End: 2019-02-27

## 2019-02-27 VITALS
RESPIRATION RATE: 16 BRPM | TEMPERATURE: 98 F | BODY MASS INDEX: 30.42 KG/M2 | SYSTOLIC BLOOD PRESSURE: 115 MMHG | OXYGEN SATURATION: 100 % | HEIGHT: 64 IN | HEART RATE: 74 BPM | DIASTOLIC BLOOD PRESSURE: 74 MMHG | WEIGHT: 178.2 LBS

## 2019-02-27 DIAGNOSIS — Z98.84 S/P LAPAROSCOPIC SLEEVE GASTRECTOMY: ICD-10-CM

## 2019-02-27 DIAGNOSIS — K90.9 INTESTINAL MALABSORPTION, UNSPECIFIED TYPE: Primary | ICD-10-CM

## 2019-02-27 NOTE — PATIENT INSTRUCTIONS
Body Mass Index: Care Instructions Your Care Instructions Body mass index (BMI) can help you see if your weight is raising your risk for health problems. It uses a formula to compare how much you weigh with how tall you are. · A BMI lower than 18.5 is considered underweight. · A BMI between 18.5 and 24.9 is considered healthy. · A BMI between 25 and 29.9 is considered overweight. A BMI of 30 or higher is considered obese. If your BMI is in the normal range, it means that you have a lower risk for weight-related health problems. If your BMI is in the overweight or obese range, you may be at increased risk for weight-related health problems, such as high blood pressure, heart disease, stroke, arthritis or joint pain, and diabetes. If your BMI is in the underweight range, you may be at increased risk for health problems such as fatigue, lower protection (immunity) against illness, muscle loss, bone loss, hair loss, and hormone problems. BMI is just one measure of your risk for weight-related health problems. You may be at higher risk for health problems if you are not active, you eat an unhealthy diet, or you drink too much alcohol or use tobacco products. Follow-up care is a key part of your treatment and safety. Be sure to make and go to all appointments, and call your doctor if you are having problems. It's also a good idea to know your test results and keep a list of the medicines you take. How can you care for yourself at home? · Practice healthy eating habits. This includes eating plenty of fruits, vegetables, whole grains, lean protein, and low-fat dairy. · If your doctor recommends it, get more exercise. Walking is a good choice. Bit by bit, increase the amount you walk every day. Try for at least 30 minutes on most days of the week. · Do not smoke. Smoking can increase your risk for health problems.  If you need help quitting, talk to your doctor about stop-smoking programs and medicines. These can increase your chances of quitting for good. · Limit alcohol to 2 drinks a day for men and 1 drink a day for women. Too much alcohol can cause health problems. If you have a BMI higher than 25 · Your doctor may do other tests to check your risk for weight-related health problems. This may include measuring the distance around your waist. A waist measurement of more than 40 inches in men or 35 inches in women can increase the risk of weight-related health problems. · Talk with your doctor about steps you can take to stay healthy or improve your health. You may need to make lifestyle changes to lose weight and stay healthy, such as changing your diet and getting regular exercise. If you have a BMI lower than 18.5 · Your doctor may do other tests to check your risk for health problems. · Talk with your doctor about steps you can take to stay healthy or improve your health. You may need to make lifestyle changes to gain or maintain weight and stay healthy, such as getting more healthy foods in your diet and doing exercises to build muscle. Where can you learn more? Go to http://desire-homar.info/. Enter S176 in the search box to learn more about \"Body Mass Index: Care Instructions. \" Current as of: June 25, 2018 Content Version: 11.9 © 9518-8236 [employer]. Care instructions adapted under license by [employer] (which disclaims liability or warranty for this information). If you have questions about a medical condition or this instruction, always ask your healthcare professional. [place] [address] any warranty or liability for your use of this information. Patient Instructions 1. Continue to monitor carbohydrate and protein intake- remember to keep your           total  carbohydrates to 50 grams or less per day for best results. 2. Remember hydration goals - usually 48 to 64 ounces of liquids per day 3. Continue to work towards exercise goals - minimum 3 days per week of 45          minutes to  1 hour at a time. 4. Remember to take vitamins as directed Supplement Resource Guide Importance of Protein:  
Maintains lean body mass, produces antibodies to fight off infections, heals wounds, minimizes hair loss, helps to give you energy, helps with satiety, and keeping you full between meals. Importance of Calcium: 
Needed for healthy bones and teeth, normal blood clotting, and nervous system functioning, higher risk of osteoporosis and bone disease with non-compliance. Importance of Multivitamins: Many functions. Supply you with extra nutrients that you may be missing from food. May lead to iron deficiency anemia, weakness, fatigue, and many other symptoms with non-compliance. Importance of B Vitamins: 
Important for red blood cell formation, metabolism, energy, and helps to maintain a healthy nervous system. Protein Supplement Find one you like now. Use immediately after surgery. Look for: 
35-50g protein each day from your protein supplement once you reach the progression diet. 0-3 g fat per serving 0-3 g sugar per serving Protein drinks should be split in separate dosages. Recommend: Lifelong 1 year +Calcium Supplement:  
 
Start taking within a month after surgery. Look for: Calcium Citrate Plus D (1500 mg per day) Recommend: Citracal 
 
 . Avoid chocolate chewable calcium. Can use chewable bariatric or [employer] brand or similar chewable. The body cannot absorb more than 500-600 mg @ a time. Take for Life Multi-vitamin Supplement:   
1st Month After Surgery: Any complete chewable, such as: [employer]s Complete chewables. Avoid [employer] sours or gummies. They lack iron and other important nutrients and also have added sugar. Continue with chewable vitamin or change to adult complete multivitamin one month after surgery. Menstruating women can take a prenatal vitamin. Make sure has at least 18 mg iron and 710-068 mcg folic acid): Vitamin B12, B Complex Vitamin, and Biotin Start taking within a month after surgery. Vitamin B12:  1000 mcg of Vitamin B12 three times weekly Must take sublingually (meaning you take it under your tongue) or in a liquid drop form for easy absorption. B Complex Vitamin: Take a pill or liquid drop form once daily. Biotin: This vitamin can help prevent hair loss. Recommend 5mg  
(5000 mcg) a day Biotin is Optional

## 2019-06-12 ENCOUNTER — APPOINTMENT (OUTPATIENT)
Dept: GENERAL RADIOLOGY | Age: 34
End: 2019-06-12
Attending: SPECIALIST
Payer: COMMERCIAL

## 2019-06-12 ENCOUNTER — HOSPITAL ENCOUNTER (OUTPATIENT)
Age: 34
Setting detail: OUTPATIENT SURGERY
Discharge: HOME | End: 2019-06-12
Attending: SPECIALIST | Admitting: SPECIALIST
Payer: COMMERCIAL

## 2019-06-12 ENCOUNTER — OFFICE VISIT (OUTPATIENT)
Dept: SURGERY | Age: 34
End: 2019-06-12

## 2019-06-12 VITALS
HEART RATE: 64 BPM | RESPIRATION RATE: 16 BRPM | TEMPERATURE: 97.6 F | DIASTOLIC BLOOD PRESSURE: 77 MMHG | SYSTOLIC BLOOD PRESSURE: 117 MMHG | WEIGHT: 157.2 LBS | BODY MASS INDEX: 26.84 KG/M2 | OXYGEN SATURATION: 100 % | HEIGHT: 64 IN

## 2019-06-12 VITALS
HEART RATE: 54 BPM | TEMPERATURE: 96.1 F | BODY MASS INDEX: 26.98 KG/M2 | OXYGEN SATURATION: 100 % | DIASTOLIC BLOOD PRESSURE: 77 MMHG | WEIGHT: 158 LBS | HEIGHT: 64 IN | SYSTOLIC BLOOD PRESSURE: 115 MMHG

## 2019-06-12 DIAGNOSIS — K90.9 INTESTINAL MALABSORPTION, UNSPECIFIED TYPE: ICD-10-CM

## 2019-06-12 DIAGNOSIS — Z98.84 S/P LAPAROSCOPIC SLEEVE GASTRECTOMY: ICD-10-CM

## 2019-06-12 DIAGNOSIS — R11.15 INTRACTABLE CYCLICAL VOMITING WITH NAUSEA: ICD-10-CM

## 2019-06-12 DIAGNOSIS — R13.19 OTHER DYSPHAGIA: Primary | ICD-10-CM

## 2019-06-12 PROCEDURE — 76040000019: Performed by: SPECIALIST

## 2019-06-12 PROCEDURE — 74240 X-RAY XM UPR GI TRC 1CNTRST: CPT

## 2019-06-12 PROCEDURE — 77030032490 HC SLV COMPR SCD KNE COVD -B: Performed by: SPECIALIST

## 2019-06-12 PROCEDURE — 74011000255 HC RX REV CODE- 255: Performed by: SPECIALIST

## 2019-06-12 RX ORDER — CYANOCOBALAMIN 1000 UG/ML
1000 INJECTION, SOLUTION INTRAMUSCULAR; SUBCUTANEOUS
Qty: 3 VIAL | Refills: 11 | Status: SHIPPED | OUTPATIENT
Start: 2019-06-12 | End: 2019-07-02 | Stop reason: SDUPTHER

## 2019-06-12 RX ORDER — METOCLOPRAMIDE 10 MG/1
10 TABLET ORAL
Qty: 120 TAB | Refills: 3 | Status: SHIPPED | OUTPATIENT
Start: 2019-06-12 | End: 2019-07-12

## 2019-06-12 RX ORDER — GLUCOSAMINE/CHONDR SU A SOD 750-600 MG
TABLET ORAL
COMMUNITY

## 2019-06-12 NOTE — PROGRESS NOTES
Subjective:      Gerson Hassan is a 26 y.o. female is now 6 months status post laparoscopic sleeve gastrectomy. Doing well overall. She has lost a total of 57 pounds since surgery. Body mass index is 26.98 kg/m². Has lost 69% of EBW. Currently on a solid food diet with difficulty, reports postprandial vomiting and denies abdominal pain. Taking in 64+oz water daily. Sources of protein include chicken, ham, red meat, cheese, nuts, yogurt and eggs. She has been craving steaks which is not normal for her. She states that she also has been bruising easier than usual.  She is not able to eat carbohydrates in the form of wraps, bread, pasta, rice, etc.  She has been eating fruit. She has not been exercising. Patient is sleeping 5+ hours a night on average. Bowel movements are alternating constipation and regularity. The patient is not having any pain. . The patient is compliant with multivitamins, calcium, Vit D and B12 supplements.      Weight Loss Metrics 6/12/2019 2/27/2019 1/22/2019 1/22/2019 1/3/2019 12/20/2018 12/18/2018   Today's Wt 157 lb 3.2 oz 178 lb 3.2 oz 189 lb 189 lb 195 lb 11.2 oz 218 lb 9.6 oz 214 lb   BMI 26.98 kg/m2 30.59 kg/m2 32.44 kg/m2 32.44 kg/m2 33.59 kg/m2 37.52 kg/m2 36.73 kg/m2          Comorbidities:    Hypertension: resolved  Diabetes: not applicable  Obstructive Sleep Apnea: resolved, not using CPAP  Hyperlipidemia: not applicable  Stress Urinary Incontinence: not applicable  Gastroesophageal Reflux: resolved  Weight related arthropathy:unchanged, chronic back pain         Patient Active Problem List   Diagnosis Code    Back pain M54.9    ADHD (attention deficit hyperactivity disorder) F90.9    Anxiety F41.9    Degenerative disc disease, lumbar M51.36    Intestinal malabsorption K90.9    S/P laparoscopic sleeve gastrectomy Z98.84        Past Medical History:   Diagnosis Date    ADHD (attention deficit hyperactivity disorder)     Anxiety     Back pain     Degenerative disc disease, lumbar     Edema extremities     GERD (gastroesophageal reflux disease)     Hypertension     [facility]bid [facility]     Morbid obesity with BMI of 40.0-44.9, adult ([facility] [facility]     GEORGE (obstructive sleep apnea)     use machine       Past Surgical History:   Procedure Laterality Date    HX DILATION AND CURETTAGE      HX WISDOM TEETH EXTRACTION      [place] LAP, BALTAZAR RESTRICT PROC, LONGITUDINAL GASTRECTOMY  12/20/2018    Dr. Jordana Hanna       Current Outpatient Medications   Medication Sig Dispense Refill    Biotin 2,500 mcg cap Take  by mouth.  metoclopramide HCl (REGLAN) 10 mg tablet Take 1 Tab by mouth Before breakfast, lunch, dinner and at bedtime for 30 days. 120 Tab 3    prenatal vit37/iron/folic acid (PRENATA PO) Take  by mouth.  cyanocobalamin (VITAMIN B-12) 1,000 mcg/mL injection 1 mL by SubCUTAneous route every thirty (30) days. 3 Vial 3    Syringe with Needle, Safety (3CC SAFETY SYRINGE 25GX5/8\") 3 mL 25 gauge x 5/8\" syrg 1 mL by Does Not Apply route every thirty (30) days. 3 Pen Needle 3    oxyCODONE-acetaminophen (PERCOCET) 5-325 mg per tablet Take 1-2 Tabs by mouth every six (6) hours as needed for Pain. Max Daily Amount: 8 Tabs. 30 Tab 0    dextroamphetamine-amphetamine (ADDERALL) 30 mg tablet Take 30 mg by mouth.          Allergies   Allergen Reactions    Hydrocodone-Acetaminophen Nausea and Vomiting     Projectile vomiting       Review of Systems:  General - No history or complaints of unexpected fever or chills  Head/Neck - No history or complaints of headache or dizziness  Cardiac - No history or complaints of chest pain, palpitations, or shortness of breath  Pulmonary - No history or complaints of shortness of breath or productive cough  Gastrointestinal - as noted above  Genitourinary - No history or complaints of hematuria/dysuria or renal lithiasis  Musculoskeletal - No history or complaints of joint  muscular weakness  Hematologic - No history of any bleeding episodes  Neurologic - No history or complaints of  migraine headaches or neurologic symptoms    Objective:     Visit Vitals  /77 (BP 1 Location: Right arm, BP Patient Position: Sitting)   Pulse 64   Temp 97.6 °F (36.4 °C)   Resp 16   Ht 5' 4\" (1.626 m)   Wt 71.3 kg (157 lb 3.2 oz)   SpO2 100%   BMI 26.98 kg/m²       General:  alert, cooperative, no distress, appears stated age   Chest: lungs clear to auscultation, no accessory muscle use   Cor:   Regular rate and rhythm   Abdomen: soft, bowel sounds active, non-tender, no masses or organomegaly   Incisions:   Healed well       Assessment:   History of Morbid obesity, status post laparoscopic sleeve gastrectomy. Doing well postoperatively. Postprandial nausea and vomiting - Reglan and UGI, if UGI is normal, next step will be US of abd  Sleep goal is 7-9 hours each night. Patient education given on the effects of sleep deprivation on weight control. Increase fluid intake to >64oz daily or more of sugar free and caffeine free fluids. Exercise a minimum of 30 minutes daily. Plan:     1. Food intolerances of unclear etiology, schedule for UGI study. Possible ultrasound of abdomen  2. Discussed patients weight loss goals and dietary choices in relation to goals. 3. Sleep goal is 7-9 hours each night. Patient education given on the effects of sleep deprivation on weight control. 4. Reminded to measure portions, continue high protein, low carbohydrate diet. Reminded to eat regularly, to eat slowly & not to drink with meals. 5. Continue vitamin supplementation  6. Continue current medications and follow up with PCP for management of regimen. 7. Continue cardio exercise and add resistance exercises. 60-90 minutes of aerobic activity 5 days a week and strength training 2 days each week. 8. Encouraged to attend support group   9. Patient to complete labs before next visit. Lab slip given today.   10. I have discussed this plan with patient and they verbalized understanding  11. Follow up in 3 months or sooner if patient has questions, concerns or worsening of condition, if unable to reach our office, patient should report to the ED. 15. Ms. Fany Calabrese has a reminder for a \"due or due soon\" health maintenance. I have asked that she contact her primary care provider for a follow-up on this health maintenance.

## 2019-06-12 NOTE — PATIENT INSTRUCTIONS
Body Mass Index: Care Instructions Your Care Instructions Body mass index (BMI) can help you see if your weight is raising your risk for health problems. It uses a formula to compare how much you weigh with how tall you are. · A BMI lower than 18.5 is considered underweight. · A BMI between 18.5 and 24.9 is considered healthy. · A BMI between 25 and 29.9 is considered overweight. A BMI of 30 or higher is considered obese. If your BMI is in the normal range, it means that you have a lower risk for weight-related health problems. If your BMI is in the overweight or obese range, you may be at increased risk for weight-related health problems, such as high blood pressure, heart disease, stroke, arthritis or joint pain, and diabetes. If your BMI is in the underweight range, you may be at increased risk for health problems such as fatigue, lower protection (immunity) against illness, muscle loss, bone loss, hair loss, and hormone problems. BMI is just one measure of your risk for weight-related health problems. You may be at higher risk for health problems if you are not active, you eat an unhealthy diet, or you drink too much alcohol or use tobacco products. Follow-up care is a key part of your treatment and safety. Be sure to make and go to all appointments, and call your doctor if you are having problems. It's also a good idea to know your test results and keep a list of the medicines you take. How can you care for yourself at home? · Practice healthy eating habits. This includes eating plenty of fruits, vegetables, whole grains, lean protein, and low-fat dairy. · If your doctor recommends it, get more exercise. Walking is a good choice. Bit by bit, increase the amount you walk every day. Try for at least 30 minutes on most days of the week. · Do not smoke. Smoking can increase your risk for health problems.  If you need help quitting, talk to your doctor about stop-smoking programs and medicines. These can increase your chances of quitting for good. · Limit alcohol to 2 drinks a day for men and 1 drink a day for women. Too much alcohol can cause health problems. If you have a BMI higher than 25 · Your doctor may do other tests to check your risk for weight-related health problems. This may include measuring the distance around your waist. A waist measurement of more than 40 inches in men or 35 inches in women can increase the risk of weight-related health problems. · Talk with your doctor about steps you can take to stay healthy or improve your health. You may need to make lifestyle changes to lose weight and stay healthy, such as changing your diet and getting regular exercise. If you have a BMI lower than 18.5 · Your doctor may do other tests to check your risk for health problems. · Talk with your doctor about steps you can take to stay healthy or improve your health. You may need to make lifestyle changes to gain or maintain weight and stay healthy, such as getting more healthy foods in your diet and doing exercises to build muscle. Where can you learn more? Go to http://desire-homar.info/. Enter S176 in the search box to learn more about \"Body Mass Index: Care Instructions. \" Current as of: June 25, 2018 Content Version: 11.9 © 6907-5637 [employer]. Care instructions adapted under license by [employer] (which disclaims liability or warranty for this information). If you have questions about a medical condition or this instruction, always ask your healthcare professional. [place] [address] any warranty or liability for your use of this information. Patient Instructions 1. Remember hydration goals - minimum of 64 ounces of liquids per day (dehydration is the number one reason for hospital readmission). 2. Sleep 7-9 hours each night to keep your metabolism up. 3. Continue to monitor carbohydrate and protein intake you need a minimum of  Grams of protein daily- remember to keep your total carbohydrates to 50 grams or less per day for best results. 4. To maximize weight loss keep your caloric intake between 800-1,200 calories daily. If you are exercising excessively, such as training for a marathon, you need to keep a food log and meet with the dietician so they can advise you on your diet choices, carbohydrate intake and caloric intake. 5. Continue to work towards exercise goals - 60-90 minutes, 5 times a week minimum of deliberate, aerobic exercise is the ultimate goal with strength training 2 times each week. Refer to [employer] for  information. 6. Remember to take vitamins as directed in your handbook. 7. Attend support group the 2nd Thursday of each month. 8. Constipation: Milk of Magnesia is for immediate relief only. Miralax is to be used every day if constipation is a chronic problem. 9. Diarrhea: patients will occasionally develop lactose intolerance after surgery. Check to see if your protein shake has whey in it. If it does try a protein powder or drink that does not have whey and stop all yogurts, cheeses and milks to see if the diarrhea goes away. 10. If you have had labs drawn. We will only call you if you have abnormal results. Otherwise you can access the lab results in \"mychart\". You will only need the access code the first time you sign on. 11. Call us at [phone] or email us through [facility]" with questions,     concerns or worsening of condition, we have someone on call 24 hours a day. If you are unable to reach our office, you are to go to your Primary Care Physician or the Emergency Department. Supplement Resource Guide Importance of Protein:  
Maintains lean body mass, produces antibodies to fight off infections, heals wounds, minimizes hair loss, helps to give you energy, helps with satiety, and keeping you full between meals. Importance of Calcium: 
Needed for healthy bones and teeth, normal blood clotting, and nervous system functioning, higher risk of osteoporosis and bone disease with non-compliance. Importance of Multivitamins: Many functions. Supply you with extra nutrients that you may be missing from food. May lead to iron deficiency anemia, weakness, fatigue, and many other symptoms with non-compliance. Importance of B Vitamins: 
Important for red blood cell formation, metabolism, energy, and helps to maintain a healthy nervous system. Protein Supplement Liquid diet phase: consume 90-100g protein daily. Once you are eating consume 35-50g protein each day from your protein supplement. 0-3 g fat per serving 0-3 g sugar per serving The body can only absorb 30g of protein at one time, so do not consume more than that at one time. Recommend: Lifelong use Multi-vitamin Supplement:   
Start immediately after surgery: any complete chewable, such as: [employer]unchs Complete chewables. Avoid [employer]unch sours or gummies. They lack iron and other important nutrients and also have added sugar. Continue with a chewable vitamin or change to an adult complete multivitamin one month after surgery. Menstruating women can take a prenatal vitamin. Make sure it has at least 18 mg iron and 701-623 mcg folic acid Calcium Supplement:  
 
Start taking within one month after surgery. Look for:  
Calcium Citrate Plus D (1500 mg per day) Recommend: Citracal 
 
Avoid chocolate chewable calcium. Can use chewable bariatric or [employer] brand or similar chewable. The body cannot absorb more than 500-600 mg of calcium at one time. Take for Life Vitamin B12 B Complex Vitamin Start taking both within one month after surgery. Vitamin B12 (sublingual): Take 1000 mcg of Vitamin B12 three times weekly Must take sublingually (meaning you put it under your tongue) or in a liquid drop form for easy absorption. B Complex Vitamin:  
Take one pill daily or liquid drop form daily; as directed on bottle. Take for Life

## 2019-06-12 NOTE — PROCEDURES
6 months post sleeve resection with acute worsening dysphagia. Normal UGI - add reglan.   See dictation

## 2019-06-14 NOTE — PROCEDURES
[facility] [place]  PROCEDURE NOTE    Name:  Davis Pascal  MR#:   [id number]  :  1985  ACCOUNT #:  [de-identified]  DATE OF SERVICE:  2019    PREOPERATIVE DIAGNOSIS:  The patient is 6 months postoperative from sleeve gastrectomy, with food intolerances of unclear etiology. POSTOPERATIVE DIAGNOSIS:  The patient is 6 months postoperative from sleeve gastrectomy, with food intolerances of unclear etiology. PROCEDURE PERFORMED:  Upper gastrointestinal study with barium. SURGEON:  Debby Pop. Brody Yanes MD    ASSISTANT:  None. ANESTHESIA:  None. ESTIMATED BLOOD LOSS:  None. SPECIMENS REMOVED:  None. COMPLICATIONS:  None. IMPLANTS:  None. STATEMENT OF MEDICAL NECESSITY:  The patient is a patient of mine, who recently, 6 months ago, underwent a laparoscopic sleeve gastrectomy for the diagnosis of morbid obesity. She did fine initially with both liquids and then soft foods and then ultimately solids but, as of late, has had issues once again with solid foods and this is of unclear etiology. She presented to me earlier today with these symptoms, and she is here today for upper GI study to assess her gastric sleeve for any type of kinking or twisting that might be occurring. PROCEDURE:  The patient was brought to the Fluoro Unit where she was given thin barium. On swallowing barium, she was noted to have normal peristalsis of her esophagus. She had prompt filling of the distal esophagus with tapering into and through the gastroesophageal junction. Contrast then filled the proximal, mid, and distal sleeve, all of which was totally normal in caliber and dimension. Contrast then ultimately flowed into the pylorus, through the pylorus, into the duodenum in a normal fashion. There was no evidence of any stricture formation, and this study was totally normal.  At this juncture, it is not clear as to why she is having these symptoms.   We are going to place her on some Reglan temporarily, to see if it improves her symptoms in the short term.       Aneta Oro MD      [id number]  D:  06/13/2019 9:39  T:  06/13/2019 12:34  JOB #:  [id number]

## 2019-07-02 RX ORDER — CYANOCOBALAMIN 1000 UG/ML
1000 INJECTION, SOLUTION INTRAMUSCULAR; SUBCUTANEOUS
Qty: 3 VIAL | Refills: 11 | Status: SHIPPED | OUTPATIENT
Start: 2019-07-02 | End: 2020-07-17 | Stop reason: SDUPTHER

## 2019-08-26 DIAGNOSIS — R13.19 OTHER DYSPHAGIA: ICD-10-CM

## 2019-08-26 DIAGNOSIS — R11.15 INTRACTABLE CYCLICAL VOMITING WITH NAUSEA: ICD-10-CM

## 2019-08-26 DIAGNOSIS — Z98.84 S/P LAPAROSCOPIC SLEEVE GASTRECTOMY: ICD-10-CM

## 2019-09-17 ENCOUNTER — TELEPHONE (OUTPATIENT)
Dept: SURGERY | Age: 34
End: 2019-09-17

## 2019-09-25 ENCOUNTER — OFFICE VISIT (OUTPATIENT)
Dept: SURGERY | Age: 34
End: 2019-09-25

## 2019-09-25 VITALS
DIASTOLIC BLOOD PRESSURE: 71 MMHG | OXYGEN SATURATION: 100 % | HEART RATE: 67 BPM | SYSTOLIC BLOOD PRESSURE: 113 MMHG | WEIGHT: 156.9 LBS | TEMPERATURE: 98.3 F | RESPIRATION RATE: 16 BRPM | HEIGHT: 64 IN | BODY MASS INDEX: 26.79 KG/M2

## 2019-09-25 DIAGNOSIS — R10.11 RIGHT UPPER QUADRANT ABDOMINAL PAIN: Primary | ICD-10-CM

## 2019-09-25 NOTE — PROGRESS NOTES
Ángel Eaton is a 37 y.o. female presents in office for    Chief Complaint   Patient presents with    Abdominal Pain     Pt states RLQ abdominal pain that shoots to mid upper abdomen; Pt states it's a stabbing sensation        Visit Vitals  /71 (BP 1 Location: Right arm, BP Patient Position: Sitting)   Pulse 67   Temp 98.3 °F (36.8 °C) (Temporal)   Resp 17   Ht 5' 4\" (1.626 m)   Wt 71.2 kg (156 lb 14.4 oz)   SpO2 100%   BMI 26.93 kg/m²         Health Maintenance Due   Topic Date Due    DTaP/Tdap/Td series (1 - Tdap) 08/29/2004    PAP AKA CERVICAL CYTOLOGY  08/29/2004    Influenza Age 9 to Adult  08/01/2019         1. Have you been to the ER, urgent care clinic since your last visit? Hospitalized since your last visit? [address] [place] [address] 9/4/19 r/t RLQ abdominal pain    2. Have you seen or consulted any other health care providers outside of the [address] [place] [address] since your last visit? Include any pap smears or colon screening.  [address] [place] [address]     Learning Assessment 6/12/2019   PRIMARY LEARNER Patient   HIGHEST LEVEL OF EDUCATION - PRIMARY LEARNER  -   BARRIERS PRIMARY LEARNER -   [address]    NEED -   LEARNER PREFERENCE PRIMARY DEMONSTRATION   LEARNING SPECIAL TOPICS -   ANSWERED BY patient   RELATIONSHIP SELF

## 2019-09-25 NOTE — PATIENT INSTRUCTIONS

## 2019-09-25 NOTE — PROGRESS NOTES
9 month follow-up    Subjective:     Kaylee Baer  is a 37 y.o. female who presents for follow-up about 9 months following laparoscopic sleeve gastrectomy. She has lost a total of 58 pounds since surgery. Body mass index is 26.93 kg/m². . EBWL is (69%). The patient presents today to assess their progress toward their goal of weight loss and to address any issues that may be present. Today the patient and I have reviewed their diet and how appropriate their food choices are. The following issues have been identified - Pt states RLQ abdominal pain that shoots to mid upper abdomen; Pt states it's a stabbing sensation. Pain assessment - 3  . Surgery related complication: Need for UGI at 6 month lianet for dysphagia - UGI was normal - added reglan       She reports abdominal pain and denies vomiting and diarrhea. The patient's exercise level: moderately active. Changes in her medical history and medications have been reviewed.     Patient Active Problem List   Diagnosis Code    Back pain M54.9    ADHD (attention deficit hyperactivity disorder) F90.9    Anxiety F41.9    Degenerative disc disease, lumbar M51.36    Intestinal malabsorption K90.9    S/P laparoscopic sleeve gastrectomy Z98.84     Past Medical History:   Diagnosis Date    ADHD (attention deficit hyperactivity disorder)     Anxiety     Back pain     Degenerative disc disease, lumbar     Edema extremities     GERD (gastroesophageal reflux disease)     Hypertension     Morbid obesity ([address]     Morbid obesity with BMI of 40.0-44.9, adult ([address]     GEORGE (obstructive sleep apnea)     use machine     Past Surgical History:   Procedure Laterality Date    HX DILATION AND CURETTAGE      HX WISDOM TEETH EXTRACTION      [place] LAP, BALTAZAR RESTRICT PROC, LONGITUDINAL GASTRECTOMY  12/20/2018    Dr. Samuel Albright     Current Outpatient Medications   Medication Sig Dispense Refill    dextroamphetamine-amphetamine (ADDERALL) 30 mg tablet Take 30 mg by mouth.  cyanocobalamin (VITAMIN B-12) 1,000 mcg/mL injection 1 mL by SubCUTAneous route every thirty (30) days. Indications: Prevention of Vitamin B12 Deficiency 3 Vial 11    Syringe with Needle, Safety (3CC SAFETY SYRINGE 25GX5/8\") 3 mL 25 gauge x 5/8\" syrg 1 mL by Does Not Apply route every thirty (30) days. 3 Pen Needle 3    Biotin 2,500 mcg cap Take  by mouth.  prenatal vit37/iron/folic acid (PRENATA PO) Take  by mouth.  oxyCODONE-acetaminophen (PERCOCET) 5-325 mg per tablet Take 1-2 Tabs by mouth every six (6) hours as needed for Pain. Max Daily Amount: 8 Tabs. 30 Tab 0          Review of Symptoms:     General - No history or complaints of unexpected fever or chills  Head/Neck - No history or complaints of headache or dizziness  Cardiac - No history or complaints of chest pain, palpitations, or shortness of breath  Pulmonary - No history or complaints of shortness of breath or productive cough  Gastrointestinal - as noted above  Genitourinary - No history or complaints of hematuria/dysuria or renal lithiasis  Musculoskeletal - No history or complaints of joint  muscular weakness  Hematologic - No history of any bleeding episodes  Neurologic - No history or complaints of  migraine headaches or neurologic symptoms    Objective:     Visit Vitals  /71 (BP 1 Location: Right arm, BP Patient Position: Sitting)   Pulse 67   Temp 98.3 °F (36.8 °C) (Temporal)   Resp 16   Ht 5' 4\" (1.626 m)   Wt 71.2 kg (156 lb 14.4 oz)   LMP 09/11/2019 (Approximate)   SpO2 100%   BMI 26.93 kg/m²        Physical Exam:    General:  alert, cooperative, no distress, appears stated age   Lungs:   clear to auscultation bilaterally   Heart:  Regular rate and rhythm   Abdomen:   abdomen is soft without significant tenderness, masses, organomegaly or guarding; Incisions: healing well, no          Assessment:     1. History of Morbid obesity, status post  laparoscopic sleeve gastrectomy.  The patient had a normal UGI this past summer. Since that visit she has had two CT scans at [facility]    CT scan from 8/28/2019   1.  Findings suggestive of a ruptured hemorrhagic right ovarian cyst.  2.  No evidence of acute appendicitis. 3.  Status post gastric sleeve procedure. Signed By: Joselito Molina MD on 8/28/2019 4:53 PM    CT scan from 9/4/2019  ABDOMEN:  1. Periportal edema, mild hepatic congestion and engorgement of the IVC, likely reflective of aggressive hydration. 2. No drainable abscess or collection within the abdomen. 3. Status post sleeve gastrectomy. PELVIS:  1. No CT evidence for acute appendicitis. 2. Slight interval increase in the extent of free pelvic fluid. This may be physiologic in nature or reflective of a recently ruptured ovarian cyst. No hemoperitoneum. Signed By: Sinai Hernandez MD on 9/4/2019 2:20 PM    Of note is also the fact that she had a colonoscopy in May with Dr. Austin Llamas where four polyps were removed    She still has her gallbladder (which was noted to be \"normal\" in both of the above CT scans)    Plan:     Plan for ultrasound of gallbladder     1. Remember to measure portions, continue low carbohydrate diet  2. Continue to watch portion size. 3. Remember vitamin supplements. 4. Exercise regimen appears adequate. 5. Attend support group  6. Follow-up in 1 month(s). 7. Total time spent with the patient 30 minutes. 8. Obtain lab prior to next visit which will be 1 year anniversary.   9. The patient understands the plan of action

## 2019-12-13 ENCOUNTER — TELEPHONE (OUTPATIENT)
Dept: SURGERY | Age: 34
End: 2019-12-13

## 2019-12-31 RX ORDER — OMEPRAZOLE 20 MG/1
20 CAPSULE, DELAYED RELEASE ORAL DAILY
Qty: 30 CAP | Refills: 3 | Status: SHIPPED | OUTPATIENT
Start: 2019-12-31 | End: 2020-06-26 | Stop reason: SDUPTHER

## 2020-01-08 ENCOUNTER — OFFICE VISIT (OUTPATIENT)
Dept: SURGERY | Age: 35
End: 2020-01-08

## 2020-01-08 VITALS
BODY MASS INDEX: 26.41 KG/M2 | TEMPERATURE: 97.7 F | SYSTOLIC BLOOD PRESSURE: 121 MMHG | HEART RATE: 60 BPM | HEIGHT: 64 IN | DIASTOLIC BLOOD PRESSURE: 76 MMHG | WEIGHT: 154.7 LBS | RESPIRATION RATE: 16 BRPM

## 2020-01-08 DIAGNOSIS — K90.9 INTESTINAL MALABSORPTION, UNSPECIFIED TYPE: Primary | ICD-10-CM

## 2020-01-08 DIAGNOSIS — K90.9 INTESTINAL MALABSORPTION, UNSPECIFIED TYPE: ICD-10-CM

## 2020-01-08 DIAGNOSIS — Z98.84 S/P LAPAROSCOPIC SLEEVE GASTRECTOMY: ICD-10-CM

## 2020-01-08 NOTE — PROGRESS NOTES
1 year follow-up    Subjective:     Eric Dominguez  is a 37 y.o. female who presents for follow-up about 12.5 months following laparoscopic sleeve gastrectomy. She has lost a total of 60 pounds since surgery. Body mass index is 26.55 kg/m². . EBWL is (72%). The patient presents today to assess their progress toward their goal of weight loss and to address any issues that may be present. Today the patient and I have reviewed their diet and how appropriate their food choices are. The following issues have been identified - none from a surgical standpoint. All of her issues are ortho / back related    Pain assessment - 0/10 (from a surgical standpoint)  . Surgery related complication: NA - did need UGI at 6 month lianet for dysphagia (normal)     She reports no issues from a surgical standpoint. She is asking about her lower back pain (which was present before surgery) as she is wondering if \"weight loss\" can worsen her back pain and denies vomiting, diarrhea and difficulty breathing. The patient's exercise level: moderately active. Changes in her medical history and medications have been reviewed.     Patient Active Problem List   Diagnosis Code    Back pain M54.9    ADHD (attention deficit hyperactivity disorder) F90.9    Anxiety F41.9    Degenerative disc disease, lumbar M51.36    Intestinal malabsorption K90.9    S/P laparoscopic sleeve gastrectomy Z98.84     Past Medical History:   Diagnosis Date    ADHD (attention deficit hyperactivity disorder)     Anxiety     Back pain     Degenerative disc disease, lumbar     Edema extremities     GERD (gastroesophageal reflux disease)     Hypertension     Morbid obesity ([address]     Morbid obesity with BMI of 40.0-44.9, adult ([address]     GEORGE (obstructive sleep apnea)     use machine     Past Surgical History:   Procedure Laterality Date    HX DILATION AND CURETTAGE      HX WISDOM TEETH EXTRACTION      MD LAP, BALTAZAR RESTRICT PROC, LONGITUDINAL GASTRECTOMY  12/20/2018    Dr. Deysi Blankenship     Current Outpatient Medications   Medication Sig Dispense Refill    omeprazole (PRILOSEC) 20 mg capsule Take 1 Cap by mouth daily. 30 Cap 3    cyanocobalamin (VITAMIN B-12) 1,000 mcg/mL injection 1 mL by SubCUTAneous route every thirty (30) days. Indications: Prevention of Vitamin B12 Deficiency 3 Vial 11    Syringe with Needle, Safety (3CC SAFETY SYRINGE 25GX5/8\") 3 mL 25 gauge x 5/8\" syrg 1 mL by Does Not Apply route every thirty (30) days. 3 Pen Needle 3    Biotin 2,500 mcg cap Take  by mouth.  prenatal vit37/iron/folic acid (PRENATA PO) Take  by mouth.  dextroamphetamine-amphetamine (ADDERALL) 30 mg tablet Take 30 mg by mouth.  oxyCODONE-acetaminophen (PERCOCET) 5-325 mg per tablet Take 1-2 Tabs by mouth every six (6) hours as needed for Pain. Max Daily Amount: 8 Tabs.  30 Tab 0       Review of Symptoms:     General - No history or complaints of unexpected fever or chills  Head/Neck - No history or complaints of headache or dizziness  Cardiac - No history or complaints of chest pain, palpitations, or shortness of breath  Pulmonary - No history or complaints of shortness of breath or productive cough  Gastrointestinal - as noted above  Genitourinary - No history or complaints of hematuria/dysuria or renal lithiasis  Musculoskeletal - No history or complaints of joint  muscular weakness  Hematologic - No history of any bleeding episodes  Neurologic - No history or complaints of  migraine headaches or neurologic symptoms    Objective:     Visit Vitals  /76 (BP 1 Location: Left arm, BP Patient Position: Sitting)   Pulse 60   Temp 97.7 °F (36.5 °C)   Resp 16   Ht 5' 4\" (1.626 m)   Wt 70.2 kg (154 lb 11.2 oz)   LMP 12/05/2019 (Exact Date)   BMI 26.55 kg/m²        Physical Exam:    General:  alert, cooperative, no distress, appears stated age   Lungs:   clear to auscultation bilaterally   Heart:  Regular rate and rhythm   Abdomen:   abdomen is soft without significant tenderness, masses, organomegaly or guarding; Incisions: healing well, no significant drainage         Labs:     No results found for this or any previous visit (from the past 2016 hour(s)). Assessment:     1. History of Morbid obesity, status post  laparoscopic sleeve gastrectomy. Doing well, no concerns from a surgical standpoint. We have spent a great deal of time today discussing changing body dynamics post weight loss and how this can effect the body. She was considering a lumbar fusion prior to surgery and would like to continue to avoid this if possible. We have discussed conservative measures to address her pain as well as low impact exercises. She did not get her labs drawn as ordered - she will get them drawn today    We discussed there right sided pain as first addressed at her last visit. She states she never got her gallbladder US because the pain got better. We spent a great deal of time discussing gallbladder anatomy and physiology and she understands that despite two recent CT scans that do not mention her gallbladder as a source of issue that there are physiologic factors that are not picked up on CT scan     Plan:     1. Remember to measure portions, continue low carbohydrate diet  2. Labs ordered today  3. Remember vitamin supplements. 4. Exercise regimen appears inadequate. 5. Attend support group  6. Follow-up in 6 month(s). 7. The patient understands the plan of action  8. Total time spent with the patient 30 minutes. I have reviewed the patients history and clinical findings with my physician extender in person. The patient has had all of their questions answered today including both exercise and dietary issues. I have reviwed any relevant  data and agree with the current plan of action.           Samuel Sykes M.D.

## 2020-01-08 NOTE — PROGRESS NOTES
1. Have you been to the ER, urgent care clinic since your last visit? Hospitalized since your last visit? No    2. Have you seen or consulted any other health care providers outside of the [address] [place] [address] since your last visit? Include any pap smears or colon screening.  No        Chief Complaint   Patient presents with    Follow-up

## 2020-04-01 ENCOUNTER — TELEPHONE (OUTPATIENT)
Dept: SURGERY | Age: 35
End: 2020-04-01

## 2020-04-01 NOTE — TELEPHONE ENCOUNTER
I called pt insurance company and spoke with Raheem Dao [phone]. She gave approval for Omeprazole 20 gm capsules for 4/1/2020 - 04/01/2021, approval # [id number].

## 2020-05-01 ENCOUNTER — VIRTUAL VISIT (OUTPATIENT)
Dept: SURGERY | Age: 35
End: 2020-05-01

## 2020-05-01 DIAGNOSIS — R10.11 RUQ PAIN: Primary | ICD-10-CM

## 2020-05-04 NOTE — PROGRESS NOTES
1.33 year follow-up / video conference due to CV-19 crisis    Subjective:     Makayla Goodwin  is a 37 y.o. female who presents for follow-up about 1.33 years following laparoscopic sleeve gastrectomy. She has lost a total of 60 pounds since surgery. There is no height or weight on file to calculate BMI. . EBWL is (72%). The patient presents today to assess their progress toward their goal of weight loss and to address any issues that may be present. Today the patient and I have reviewed their diet and how appropriate their food choices are. The following issues have been identified - impromptu F/U visit for persistent RUQ / epigastric pain that has been addressed at two previous visits. The following is an excerpt from her \"1 year visit\" in Jan of this year -     We discussed there right sided pain as first addressed at her last visit. She states she never got her gallbladder US because the pain got better. We spent a great deal of time discussing gallbladder anatomy and physiology and she understands that despite two recent CT scans that do not mention her gallbladder as a source of issue that there are physiologic factors that are not picked up on CT scan     Pain assessment - 0/10 (at the time of this video encounter) - she does have pain issues related to ortho conditions that existed well before her sleeve surgery  . Surgery related complication: NA       She reports sporadic RUQ / epigastric pains after eating and denies vomiting, diarrhea and difficulty breathing. The patient's exercise level: moderately active. Changes in her medical history and medications have been reviewed.     Patient Active Problem List   Diagnosis Code    Back pain M54.9    ADHD (attention deficit hyperactivity disorder) F90.9    Anxiety F41.9    Degenerative disc disease, lumbar M51.36    Intestinal malabsorption K90.9    S/P laparoscopic sleeve gastrectomy Z98.84     Past Medical History:   Diagnosis Date    ADHD (attention deficit hyperactivity disorder)     Anxiety     Back pain     Degenerative disc disease, lumbar     Edema extremities     GERD (gastroesophageal reflux disease)     Hypertension     [facility]bid [facility]     Morbid obesity with BMI of 40.0-44.9, adult ([facility]     GEORGE (obstructive sleep apnea)     use machine     Past Surgical History:   Procedure Laterality Date    HX DILATION AND CURETTAGE      HX WISDOM TEETH EXTRACTION      VA LAP, BALTAZAR RESTRICT PROC, LONGITUDINAL GASTRECTOMY  12/20/2018    Dr. Cecil Cardoso     Current Outpatient Medications   Medication Sig Dispense Refill    omeprazole (PRILOSEC) 20 mg capsule Take 1 Cap by mouth daily. 30 Cap 3    cyanocobalamin (VITAMIN B-12) 1,000 mcg/mL injection 1 mL by SubCUTAneous route every thirty (30) days. Indications: Prevention of Vitamin B12 Deficiency 3 Vial 11    Syringe with Needle, Safety (3CC SAFETY SYRINGE 25GX5/8\") 3 mL 25 gauge x 5/8\" syrg 1 mL by Does Not Apply route every thirty (30) days. 3 Pen Needle 3    Biotin 2,500 mcg cap Take  by mouth.  prenatal vit37/iron/folic acid (PRENATA PO) Take  by mouth.  oxyCODONE-acetaminophen (PERCOCET) 5-325 mg per tablet Take 1-2 Tabs by mouth every six (6) hours as needed for Pain. Max Daily Amount: 8 Tabs. 30 Tab 0    dextroamphetamine-amphetamine (ADDERALL) 30 mg tablet Take 30 mg by mouth.          Review of Symptoms:     General - No history or complaints of unexpected fever or chills  Head/Neck - No history or complaints of headache or dizziness  Cardiac - No history or complaints of chest pain, palpitations, or shortness of breath  Pulmonary - No history or complaints of shortness of breath or productive cough  Gastrointestinal - as noted above  Genitourinary - No history or complaints of hematuria/dysuria or renal lithiasis  Musculoskeletal - No history or complaints of joint  muscular weakness  Hematologic - No history of any bleeding episodes  Neurologic - No history or complaints of  migraine headaches or neurologic symptoms    Objective: There were no vitals taken for this visit. Physical Exam:    Physical Examination: General appearance - alert, well appearing, and in no distress and oriented to person, place, and time  Mental status - alert, oriented to person, place, and time, normal mood, behavior, speech, dress, motor activity, and thought processes  Eyes - pupils equal and reactive, extraocular eye movements intact, sclera anicteric, left eye normal, right eye normal  Ears - right ear normal, left ear normal  Neck- good extension and flexion, no obvious swelling  Chest - good air movement  Heart - N/A  Abdomen - no obvious distension, scars as noted:   Neurological - alert, oriented, normal speech, no focal findings or movement disorder noted  Musculoskeletal - no swelling noted  Extremities - normal movement      Labs:     No results found for this or any previous visit (from the past 2016 hour(s)). Assessment:     1. History of Morbid obesity, status post  laparoscopic sleeve gastrectomy. The patient was once again educated in the S/S of gallbaldder disease and understands the need to proceed with an ultrasound. Plan:     1. US of gallbaldder ordered today    This visit with Christopher Hicks was performed under virtual telemedicine guidelines during the coronavirus (JOGUJ-51) public health emergency on 5/1/2020 in a video encounter. They understand that this encounter could be a billable service, with coverage determined by their insurance carrier. They are aware that   they may receive a bill and have provided verbal consent for this visit. This visit was performed with the patient in their home environment and provider was   present at [facility]. I have spent over 25 minutes on this visit  both prior to the visits reviewing the patients chart and with the patient oh the phone.    I have reviewed their medical history and discussed the plan of action to date. They understand that they will be asked   to come to the office when our office is allowed normal patient interaction, as dictated by public health officials, for a face-to-face visit to rediscuss all of the things we  have talked about today.   During this visit we discussed the varieties of surgeries that we perform, how they would impact the patient from a weight loss standpoint   considering their medical issues and prior surgeries, and also the restrictions that the patient would have long-term with the operation that they have chosen

## 2020-05-04 NOTE — PATIENT INSTRUCTIONS
Body Mass Index: Care Instructions Your Care Instructions Body mass index (BMI) can help you see if your weight is raising your risk for health problems. It uses a formula to compare how much you weigh with how tall you are. · A BMI lower than 18.5 is considered underweight. · A BMI between 18.5 and 24.9 is considered healthy. · A BMI between 25 and 29.9 is considered overweight. A BMI of 30 or higher is considered obese. If your BMI is in the normal range, it means that you have a lower risk for weight-related health problems. If your BMI is in the overweight or obese range, you may be at increased risk for weight-related health problems, such as high blood pressure, heart disease, stroke, arthritis or joint pain, and diabetes. If your BMI is in the underweight range, you may be at increased risk for health problems such as fatigue, lower protection (immunity) against illness, muscle loss, bone loss, hair loss, and hormone problems. BMI is just one measure of your risk for weight-related health problems. You may be at higher risk for health problems if you are not active, you eat an unhealthy diet, or you drink too much alcohol or use tobacco products. Follow-up care is a key part of your treatment and safety. Be sure to make and go to all appointments, and call your doctor if you are having problems. It's also a good idea to know your test results and keep a list of the medicines you take. How can you care for yourself at home? · Practice healthy eating habits. This includes eating plenty of fruits, vegetables, whole grains, lean protein, and low-fat dairy. · If your doctor recommends it, get more exercise. Walking is a good choice. Bit by bit, increase the amount you walk every day. Try for at least 30 minutes on most days of the week. · Do not smoke. Smoking can increase your risk for health problems.  If you need help quitting, talk to your doctor about stop-smoking programs and medicines. These can increase your chances of quitting for good. · Limit alcohol to 2 drinks a day for men and 1 drink a day for women. Too much alcohol can cause health problems. If you have a BMI higher than 25 · Your doctor may do other tests to check your risk for weight-related health problems. This may include measuring the distance around your waist. A waist measurement of more than 40 inches in men or 35 inches in women can increase the risk of weight-related health problems. · Talk with your doctor about steps you can take to stay healthy or improve your health. You may need to make lifestyle changes to lose weight and stay healthy, such as changing your diet and getting regular exercise. If you have a BMI lower than 18.5 · Your doctor may do other tests to check your risk for health problems. · Talk with your doctor about steps you can take to stay healthy or improve your health. You may need to make lifestyle changes to gain or maintain weight and stay healthy, such as getting more healthy foods in your diet and doing exercises to build muscle. Where can you learn more? Go to http://desire-homar.info/. Enter S176 in the search box to learn more about \"Body Mass Index: Care Instructions. \" Current as of: June 26, 2018 Content Version: 11.8 © 8956-7383 [employer]wise[employer]. Care instructions adapted under license by [employer] (which disclaims liability or warranty for this information). If you have questions about a medical condition or this instruction, always ask your healthcare professional. [address] any warranty or liability for your use of this information.

## 2020-05-08 ENCOUNTER — HOSPITAL ENCOUNTER (OUTPATIENT)
Dept: ULTRASOUND IMAGING | Age: 35
Discharge: HOME | End: 2020-05-08
Attending: SPECIALIST
Payer: MEDICAID

## 2020-05-08 ENCOUNTER — HOSPITAL ENCOUNTER (OUTPATIENT)
Dept: LAB | Age: 35
Discharge: HOME | End: 2020-05-08

## 2020-05-08 DIAGNOSIS — R10.11 RUQ PAIN: ICD-10-CM

## 2020-05-08 LAB — XX-LABCORP SPECIMEN COL,LCBCF: NORMAL

## 2020-05-08 PROCEDURE — 99001 SPECIMEN HANDLING PT-LAB: CPT

## 2020-05-08 PROCEDURE — 76705 ECHO EXAM OF ABDOMEN: CPT

## 2020-05-11 ENCOUNTER — TELEPHONE (OUTPATIENT)
Dept: SURGERY | Age: 35
End: 2020-05-11

## 2020-05-11 ENCOUNTER — PATIENT MESSAGE (OUTPATIENT)
Dept: SURGERY | Age: 35
End: 2020-05-11

## 2020-05-11 DIAGNOSIS — R10.13 EPIGASTRIC PAIN: Primary | ICD-10-CM

## 2020-05-11 LAB
25(OH)D3+25(OH)D2 SERPL-MCNC: 25.8 NG/ML (ref 30–100)
ALBUMIN SERPL-MCNC: 4.5 G/DL (ref 3.8–4.8)
ALBUMIN/GLOB SERPL: 1.7 {RATIO} (ref 1.2–2.2)
ALP SERPL-CCNC: 53 IU/L (ref 39–117)
ALT SERPL-CCNC: 22 IU/L (ref 0–32)
AST SERPL-CCNC: 20 IU/L (ref 0–40)
BASOPHILS # BLD AUTO: 0 X10E3/UL (ref 0–0.2)
BASOPHILS NFR BLD AUTO: 1 %
BILIRUB SERPL-MCNC: 1.1 MG/DL (ref 0–1.2)
BUN SERPL-MCNC: 10 MG/DL (ref 6–20)
BUN/CREAT SERPL: 13 (ref 9–23)
CALCIUM SERPL-MCNC: 9.6 MG/DL (ref 8.7–10.2)
CHLORIDE SERPL-SCNC: 103 MMOL/L (ref 96–106)
CO2 SERPL-SCNC: 25 MMOL/L (ref 20–29)
CREAT SERPL-MCNC: 0.75 MG/DL (ref 0.57–1)
EOSINOPHIL # BLD AUTO: 0.1 X10E3/UL (ref 0–0.4)
EOSINOPHIL NFR BLD AUTO: 2 %
ERYTHROCYTE [DISTWIDTH] IN BLOOD BY AUTOMATED COUNT: 12 % (ref 11.7–15.4)
FERRITIN SERPL-MCNC: 58 NG/ML (ref 15–150)
FOLATE SERPL-MCNC: 10.4 NG/ML
GLOBULIN SER CALC-MCNC: 2.6 G/DL (ref 1.5–4.5)
GLUCOSE SERPL-MCNC: 85 MG/DL (ref 65–99)
HCT VFR BLD AUTO: 40.4 % (ref 34–46.6)
HGB BLD-MCNC: 13.3 G/DL (ref 11.1–15.9)
IMM GRANULOCYTES # BLD AUTO: 0 X10E3/UL (ref 0–0.1)
IMM GRANULOCYTES NFR BLD AUTO: 0 %
IRON SERPL-MCNC: 140 UG/DL (ref 27–159)
LYMPHOCYTES # BLD AUTO: 2.1 X10E3/UL (ref 0.7–3.1)
LYMPHOCYTES NFR BLD AUTO: 41 %
MCH RBC QN AUTO: 29.7 PG (ref 26.6–33)
MCHC RBC AUTO-ENTMCNC: 32.9 G/DL (ref 31.5–35.7)
MCV RBC AUTO: 90 FL (ref 79–97)
MONOCYTES # BLD AUTO: 0.3 X10E3/UL (ref 0.1–0.9)
MONOCYTES NFR BLD AUTO: 7 %
NEUTROPHILS # BLD AUTO: 2.5 X10E3/UL (ref 1.4–7)
NEUTROPHILS NFR BLD AUTO: 49 %
PLATELET # BLD AUTO: 244 X10E3/UL (ref 150–450)
POTASSIUM SERPL-SCNC: 4.4 MMOL/L (ref 3.5–5.2)
PROT SERPL-MCNC: 7.1 G/DL (ref 6–8.5)
RBC # BLD AUTO: 4.48 X10E6/UL (ref 3.77–5.28)
SODIUM SERPL-SCNC: 141 MMOL/L (ref 134–144)
VIT B1 BLD-SCNC: 107.5 NMOL/L (ref 66.5–200)
VIT B12 SERPL-MCNC: 446 PG/ML (ref 232–1245)
WBC # BLD AUTO: 5 X10E3/UL (ref 3.4–10.8)

## 2020-05-11 RX ORDER — ERGOCALCIFEROL 1.25 MG/1
50000 CAPSULE ORAL 2 TIMES WEEKLY
Qty: 52 CAP | Refills: 0 | Status: SHIPPED | OUTPATIENT
Start: 2020-05-11 | End: 2020-11-07

## 2020-05-11 NOTE — TELEPHONE ENCOUNTER
RN spoke with Jennie singer [facility], who faxed patient's lab results. Results were shown to Dr. Theo Mariano who will be sending Vitamin D to patient's pharmacy electronically. Patient made aware and verbalized understanding. Pharmacy updated.

## 2020-05-19 ENCOUNTER — HOSPITAL ENCOUNTER (OUTPATIENT)
Dept: NUCLEAR MEDICINE | Age: 35
Discharge: HOME | End: 2020-05-19
Attending: SPECIALIST
Payer: MEDICAID

## 2020-05-19 VITALS — WEIGHT: 154 LBS | BODY MASS INDEX: 26.43 KG/M2

## 2020-05-19 DIAGNOSIS — R10.13 EPIGASTRIC PAIN: ICD-10-CM

## 2020-05-19 PROCEDURE — 74011000250 HC RX REV CODE- 250: Performed by: SPECIALIST

## 2020-05-19 PROCEDURE — 78227 HEPATOBIL SYST IMAGE W/DRUG: CPT

## 2020-05-19 PROCEDURE — 74011250636 HC RX REV CODE- 250/636: Performed by: SPECIALIST

## 2020-05-19 RX ORDER — WATER FOR INJECTION,STERILE
VIAL (ML) INJECTION
Status: COMPLETED | OUTPATIENT
Start: 2020-05-19 | End: 2020-05-19

## 2020-05-19 RX ADMIN — WATER 5 ML: 1 INJECTION INTRAMUSCULAR; INTRAVENOUS; SUBCUTANEOUS at 08:15

## 2020-05-19 RX ADMIN — SINCALIDE 1.4 MCG: 5 INJECTION, POWDER, LYOPHILIZED, FOR SOLUTION INTRAVENOUS at 08:15

## 2020-05-27 DIAGNOSIS — R10.11 RIGHT UPPER QUADRANT ABDOMINAL PAIN: Primary | ICD-10-CM

## 2020-06-11 ENCOUNTER — HOSPITAL ENCOUNTER (OUTPATIENT)
Dept: CT IMAGING | Age: 35
Discharge: HOME | End: 2020-06-11
Attending: SPECIALIST
Payer: MEDICAID

## 2020-06-11 ENCOUNTER — HOSPITAL ENCOUNTER (OUTPATIENT)
Dept: LAB | Age: 35
Discharge: HOME | End: 2020-06-11

## 2020-06-11 DIAGNOSIS — R10.11 RIGHT UPPER QUADRANT ABDOMINAL PAIN: ICD-10-CM

## 2020-06-11 LAB — XX-LABCORP SPECIMEN COL,LCBCF: NORMAL

## 2020-06-11 PROCEDURE — 74150 CT ABDOMEN W/O CONTRAST: CPT

## 2020-06-11 PROCEDURE — 99001 SPECIMEN HANDLING PT-LAB: CPT

## 2020-06-13 LAB
ALBUMIN SERPL-MCNC: 4.2 G/DL (ref 3.8–4.8)
ALBUMIN/GLOB SERPL: 1.8 {RATIO} (ref 1.2–2.2)
ALP SERPL-CCNC: 51 IU/L (ref 39–117)
ALT SERPL-CCNC: 16 IU/L (ref 0–32)
APPEARANCE UR: ABNORMAL
AST SERPL-CCNC: 15 IU/L (ref 0–40)
BACTERIA #/AREA URNS HPF: ABNORMAL /[HPF]
BACTERIA UR CULT: ABNORMAL
BILIRUB SERPL-MCNC: 0.5 MG/DL (ref 0–1.2)
BILIRUB UR QL STRIP: NEGATIVE
BUN SERPL-MCNC: 10 MG/DL (ref 6–20)
BUN/CREAT SERPL: 15 (ref 9–23)
CALCIUM SERPL-MCNC: 9.2 MG/DL (ref 8.7–10.2)
CASTS URNS QL MICRO: ABNORMAL /LPF
CHLORIDE SERPL-SCNC: 103 MMOL/L (ref 96–106)
CO2 SERPL-SCNC: 26 MMOL/L (ref 20–29)
COLOR UR: YELLOW
CREAT SERPL-MCNC: 0.65 MG/DL (ref 0.57–1)
EPI CELLS #/AREA URNS HPF: ABNORMAL /HPF (ref 0–10)
GLOBULIN SER CALC-MCNC: 2.3 G/DL (ref 1.5–4.5)
GLUCOSE SERPL-MCNC: 84 MG/DL (ref 65–99)
GLUCOSE UR QL: NEGATIVE
HGB UR QL STRIP: NEGATIVE
KETONES UR QL STRIP: ABNORMAL
LEUKOCYTE ESTERASE UR QL STRIP: NEGATIVE
LIPASE SERPL-CCNC: 29 U/L (ref 14–72)
MICRO URNS: ABNORMAL
MUCOUS THREADS URNS QL MICRO: PRESENT
NITRITE UR QL STRIP: POSITIVE
PH UR STRIP: 6 [PH] (ref 5–7.5)
POTASSIUM SERPL-SCNC: 4.2 MMOL/L (ref 3.5–5.2)
PROT SERPL-MCNC: 6.5 G/DL (ref 6–8.5)
PROT UR QL STRIP: NEGATIVE
RBC #/AREA URNS HPF: ABNORMAL /HPF (ref 0–2)
SODIUM SERPL-SCNC: 141 MMOL/L (ref 134–144)
SP GR UR: 1.03 (ref 1–1.03)
UROBILINOGEN UR STRIP-MCNC: 1 MG/DL (ref 0.2–1)
WBC #/AREA URNS HPF: ABNORMAL /HPF (ref 0–5)

## 2020-06-15 RX ORDER — SULFAMETHOXAZOLE AND TRIMETHOPRIM 800; 160 MG/1; MG/1
1 TABLET ORAL 2 TIMES DAILY
Qty: 20 TAB | Refills: 0 | Status: SHIPPED | OUTPATIENT
Start: 2020-06-15 | End: 2020-06-25

## 2020-06-16 ENCOUNTER — TELEPHONE (OUTPATIENT)
Dept: PEDIATRICS CLINIC | Age: 35
End: 2020-06-16

## 2020-06-16 ENCOUNTER — PATIENT MESSAGE (OUTPATIENT)
Dept: PEDIATRICS CLINIC | Age: 35
End: 2020-06-16

## 2020-06-16 ENCOUNTER — DOCUMENTATION ONLY (OUTPATIENT)
Dept: SURGERY | Age: 35
End: 2020-06-16

## 2020-06-16 NOTE — PROGRESS NOTES
Pt 1.42 years post sleeve resection     Called from OR dictation phone at [phone] to discuss recent CT findings - ordered given RUQ pains - pt recent study history;     Ultrasound (May 8th) - normal    HIDA Scan (May 19th) - normal at 79%    CT Scan (June 11th) - normal     Pt did have recent urinalysis with evidence of infection    Bactrim DS sent to pt pharmacy    Discussed above findings and treatment plan    Will complete antibiotics and consider GI consult if pain persist

## 2020-06-16 NOTE — TELEPHONE ENCOUNTER
Pt called back, she is aware of what Oriana Silva stated about her CT scan being normal. Pt stated that she will  her medication from the pharmacy today for her UTI. Pt wants to know what is the next step since her CT scan was normal. I told her that I will forward this to Oriana Silva.

## 2020-06-16 NOTE — TELEPHONE ENCOUNTER
Per Patsy Trinidad to call pt and tell her that her CT scan was normal, but she has a UTI and he called in an antibiotic to her pharmacy. I tried to call pt but her numbers that we have on file is not working. I will send her a [employer] message to contact the office.

## 2020-06-29 RX ORDER — OMEPRAZOLE 20 MG/1
20 CAPSULE, DELAYED RELEASE ORAL DAILY
Qty: 30 CAP | Refills: 3 | Status: SHIPPED | OUTPATIENT
Start: 2020-06-29 | End: 2020-07-20 | Stop reason: SDUPTHER

## 2020-07-18 RX ORDER — CYANOCOBALAMIN 1000 UG/ML
1000 INJECTION, SOLUTION INTRAMUSCULAR; SUBCUTANEOUS
Qty: 3 VIAL | Refills: 11
Start: 2020-07-18 | End: 2020-07-20 | Stop reason: SDUPTHER

## 2020-07-18 RX ORDER — SYRINGE,SAFETY WITH NEEDLE,3ML 25GX5/8"
1 SYRINGE, EMPTY DISPOSABLE MISCELLANEOUS
Qty: 3 PEN NEEDLE | Refills: 3 | Status: SHIPPED | OUTPATIENT
Start: 2020-07-18 | End: 2021-09-30 | Stop reason: SDUPTHER

## 2020-07-20 RX ORDER — CYANOCOBALAMIN 1000 UG/ML
1000 INJECTION, SOLUTION INTRAMUSCULAR; SUBCUTANEOUS
Qty: 3 VIAL | Refills: 11 | Status: SHIPPED | OUTPATIENT
Start: 2020-07-20 | End: 2021-10-01

## 2020-07-20 RX ORDER — OMEPRAZOLE 20 MG/1
20 CAPSULE, DELAYED RELEASE ORAL DAILY
Qty: 30 CAP | Refills: 3 | Status: SHIPPED | OUTPATIENT
Start: 2020-07-20

## 2020-11-18 ENCOUNTER — DOCUMENTATION ONLY (OUTPATIENT)
Dept: SURGERY | Age: 35
End: 2020-11-18

## 2020-11-18 NOTE — PROGRESS NOTES
Per [facility] requirements;  E-mail and letter sent for follow up appointment. [facility] Surgical Specialist  [address], [address] [facility] [address]                 [facility] [place]  [facility]  [facility]      Dear Patient,    Your health is our main concern. It is important for your health to have follow-up lab work and to see your surgeon at 2 months, 4 months, 6 months, 9 months and annually after your weight loss surgery. Additionally, the Department of Bariatric Surgery at our hospital is a member of the [employer] of [address] [place] [address] Surgical Quality Improvement Program ([facility] NSQIP). As a participant in this program, we gather information on the outcomes of our patients after surgery. Please call the office for a follow up appointment at [phone]. If you have moved out of the area or have changed surgeons please call us and let us know the name of your doctor. Your health and feedback are important to us. We greatly appreciate your response.        Thank you,  [facility] [address]

## 2021-09-13 ENCOUNTER — HOSPITAL ENCOUNTER (OUTPATIENT)
Dept: LAB | Age: 36
Discharge: HOME | End: 2021-09-13

## 2021-09-13 LAB
HCT VFR BLD AUTO: 35.3 % (ref 35–45)
HGB BLD-MCNC: 11.8 G/DL (ref 12–16)

## 2021-09-13 PROCEDURE — 85018 HEMOGLOBIN: CPT

## 2021-09-13 PROCEDURE — 36415 COLL VENOUS BLD VENIPUNCTURE: CPT

## 2021-10-01 RX ORDER — SYRINGE,SAFETY WITH NEEDLE,3ML 25GX5/8"
1 SYRINGE, EMPTY DISPOSABLE MISCELLANEOUS
Qty: 3 PEN NEEDLE | Refills: 3 | Status: SHIPPED | OUTPATIENT
Start: 2021-10-01 | End: 2021-10-07 | Stop reason: SDUPTHER

## 2021-10-01 RX ORDER — CYANOCOBALAMIN 1000 UG/ML
INJECTION, SOLUTION INTRAMUSCULAR; SUBCUTANEOUS
Qty: 3 ML | Refills: 7 | Status: SHIPPED | OUTPATIENT
Start: 2021-10-01 | End: 2021-10-01 | Stop reason: ALTCHOICE

## 2021-10-01 RX ORDER — CYANOCOBALAMIN 1000 UG/ML
1000 INJECTION, SOLUTION INTRAMUSCULAR; SUBCUTANEOUS
Qty: 1 ML | Refills: 12
Start: 2021-10-01 | End: 2021-10-07 | Stop reason: SDUPTHER

## 2021-10-07 RX ORDER — SYRINGE,SAFETY WITH NEEDLE,3ML 25GX5/8"
1 SYRINGE, EMPTY DISPOSABLE MISCELLANEOUS
Qty: 3 PEN NEEDLE | Refills: 3 | Status: SHIPPED | OUTPATIENT
Start: 2021-10-07 | End: 2021-10-07 | Stop reason: SDUPTHER

## 2021-10-07 RX ORDER — SYRINGE,SAFETY WITH NEEDLE,3ML 25GX5/8"
1 SYRINGE, EMPTY DISPOSABLE MISCELLANEOUS
Qty: 3 PEN NEEDLE | Refills: 3 | Status: SHIPPED | OUTPATIENT
Start: 2021-10-07

## 2021-10-07 RX ORDER — CYANOCOBALAMIN 1000 UG/ML
1000 INJECTION, SOLUTION INTRAMUSCULAR; SUBCUTANEOUS
Qty: 1 ML | Refills: 12
Start: 2021-10-07 | End: 2022-10-31

## 2021-10-07 RX ORDER — CYANOCOBALAMIN 1000 UG/ML
1000 INJECTION, SOLUTION INTRAMUSCULAR; SUBCUTANEOUS
Qty: 1 ML | Refills: 12 | Status: SHIPPED | OUTPATIENT
Start: 2021-10-07 | End: 2021-10-07 | Stop reason: SDUPTHER

## 2021-12-02 ENCOUNTER — DOCUMENTATION ONLY (OUTPATIENT)
Dept: SURGERY | Age: 36
End: 2021-12-02

## 2021-12-02 NOTE — PROGRESS NOTES
Per [facility] requirements;  E-mail and letter sent for follow up appointment. [facility] Specialist  [address]   [address] [place], [address]                 [facility] [place]  [facility]  [facility]      Dear Patient,    Your health is our main concern. It is important for your health to have follow-up lab work and to see your surgeon at 2 months, 4 months, 6 months, 9 months and annually after your weight loss surgery. Additionally, the Department of Bariatric Surgery at our hospital is a member of the Metabolic and Bariatric Surgery Accreditation and Quality Improvement Program [facility]). As a participant in this program, we gather information on the outcomes of our patients after surgery. Please call the office for a follow up appointment at [phone]. If you have moved out of the area or have changed surgeons please call us and let us know the name of your doctor. Your health and feedback are important to us. We greatly appreciate your response.        Thank you,  [facility] [address] [facility]

## 2022-01-19 DIAGNOSIS — K90.9 INTESTINAL MALABSORPTION, UNSPECIFIED TYPE: Primary | ICD-10-CM

## 2022-03-19 PROBLEM — Z98.84 S/P LAPAROSCOPIC SLEEVE GASTRECTOMY: Status: ACTIVE | Noted: 2019-01-03

## 2022-03-19 PROBLEM — K90.9 INTESTINAL MALABSORPTION: Status: ACTIVE | Noted: 2019-01-03

## 2022-04-27 ENCOUNTER — TRANSCRIBE ORDER (OUTPATIENT)
Dept: SCHEDULING | Age: 37
End: 2022-04-27

## 2022-04-27 DIAGNOSIS — K21.9 GERD (GASTROESOPHAGEAL REFLUX DISEASE): Primary | ICD-10-CM

## 2022-05-03 ENCOUNTER — HOSPITAL ENCOUNTER (OUTPATIENT)
Dept: GENERAL RADIOLOGY | Age: 37
Discharge: HOME | End: 2022-05-03
Attending: OTOLARYNGOLOGY
Payer: COMMERCIAL

## 2022-05-03 DIAGNOSIS — K21.9 GERD (GASTROESOPHAGEAL REFLUX DISEASE): ICD-10-CM

## 2022-05-03 PROCEDURE — 74011000250 HC RX REV CODE- 250: Performed by: OTOLARYNGOLOGY

## 2022-05-03 PROCEDURE — 74220 X-RAY XM ESOPHAGUS 1CNTRST: CPT

## 2022-05-03 RX ADMIN — BARIUM SULFATE 700 MG: 700 TABLET ORAL at 09:30

## 2022-05-03 RX ADMIN — BARIUM SULFATE 176 G: 960 POWDER, FOR SUSPENSION ORAL at 09:30

## 2022-05-03 RX ADMIN — ANTACID/ANTIFLATULENT 4 G: 380; 550; 10; 10 GRANULE, EFFERVESCENT ORAL at 09:30

## 2022-05-03 RX ADMIN — BARIUM SULFATE 135 ML: 980 POWDER, FOR SUSPENSION ORAL at 09:30

## 2022-10-31 RX ORDER — CYANOCOBALAMIN 1000 UG/ML
INJECTION, SOLUTION INTRAMUSCULAR; SUBCUTANEOUS
Qty: 1 ML | Refills: 50 | Status: SHIPPED | OUTPATIENT
Start: 2022-10-31

## 2023-05-24 NOTE — PROGRESS NOTES
Subjective:  
 
Carmine Martin  is a 26 y.o. female who presents for follow-up about 2 months following laparoscopic sleeve gastrectomy. She has lost a total of 40 pounds since surgery. Body mass index is 30.59 kg/m². [facility] Weight Loss Metrics 2/27/2019 1/22/2019 1/22/2019 1/3/2019 12/20/2018 12/18/2018 12/10/2018 Today's Wt 178 lb 3.2 oz 189 lb 189 lb 195 lb 11.2 oz 218 lb 9.6 oz 214 lb 214 lb 3.2 oz  
BMI 30.59 kg/m2 32.44 kg/m2 32.44 kg/m2 33.59 kg/m2 37.52 kg/m2 36.73 kg/m2 36.77 kg/m2 Surgery related complication: none She reports bright red blood per rectum when wipes and denies vomiting. Patients pain score:0 Weight Loss Metrics 2/27/2019 1/22/2019 1/22/2019 1/3/2019 12/20/2018 12/18/2018 12/10/2018 Today's Wt 178 lb 3.2 oz 189 lb 189 lb 195 lb 11.2 oz 218 lb 9.6 oz 214 lb 214 lb 3.2 oz  
BMI 30.59 kg/m2 32.44 kg/m2 32.44 kg/m2 33.59 kg/m2 37.52 kg/m2 36.73 kg/m2 36.77 kg/m2 The patient's exercise level: moderately active. Changes in her medical history and medications have been reviewed. Patient Active Problem List  
Diagnosis Code  GEORGE (obstructive sleep apnea) G47.33  
 Back pain M54.9  Edema extremities R60.0  GERD (gastroesophageal reflux disease) K21.9  ADHD (attention deficit hyperactivity disorder) F90.9  Anxiety F41.9  Hypertension I10  Degenerative disc disease, lumbar M51.36  
 Intestinal malabsorption K90.9  S/P laparoscopic sleeve gastrectomy Z98.84 Past Medical History:  
Diagnosis Date  ADHD (attention deficit hyperactivity disorder)  Anxiety  Back pain  Degenerative disc disease, lumbar  Edema extremities  GERD (gastroesophageal reflux disease)  Hypertension  Morbid obesity ([facility] [address]  Morbid obesity with BMI of 40.0-44.9, adult ([facility] [address]  GEORGE (obstructive sleep apnea)   
 use machine Past Surgical History:  
Procedure Laterality Date  HX DILATION AND CURETTAGE    
 Take Tylenol or ibuprofen over-the-counter as needed for pain relief.      HX WISDOM TEETH EXTRACTION    
 [place] LAP, BALTAZAR RESTRICT PROC, LONGITUDINAL GASTRECTOMY  12/20/2018 Dr. Ana Maria King Current Outpatient Medications Medication Sig Dispense Refill  prenatal vit37/iron/folic acid (PRENATA PO) Take  by mouth.  cyanocobalamin (VITAMIN B-12) 1,000 mcg/mL injection 1 mL by SubCUTAneous route every thirty (30) days. 3 Vial 3  Syringe with Needle, Safety (3CC SAFETY SYRINGE 25GX5/8\") 3 mL 25 gauge x 5/8\" syrg 1 mL by Does Not Apply route every thirty (30) days. 3 Pen Needle 3  
 oxyCODONE-acetaminophen (PERCOCET) 5-325 mg per tablet Take 1-2 Tabs by mouth every six (6) hours as needed for Pain. Max Daily Amount: 8 Tabs. 30 Tab 0  
 dextroamphetamine-amphetamine (ADDERALL) 30 mg tablet Take 30 mg by mouth.  multivitamin with iron (FLINTSTONES) chewable tablet Take 1 Tab by mouth daily. Objective:  
 
Visit Vitals /74 (BP 1 Location: Left arm, BP Patient Position: Sitting) Pulse 74 Temp 98 °F (36.7 °C) Resp 16 Ht 5' 4\" (1.626 m) Wt 80.8 kg (178 lb 3.2 oz) SpO2 100% BMI 30.59 kg/m² Physical Exam: 
 
General:  alert, cooperative, no distress, appears stated age Lungs:   clear to auscultation bilaterally Heart:  Regular rate and rhythm Abdomen:   abdomen is soft without significant tenderness, masses, organomegaly or guarding; Incisions: healing well Assessment:  
 
1. History of Morbid obesity, status post  laparoscopic sleeve gastrectomy. Doing well; no concerns. Yoselin Rodriguez Treat with hemorrhoidal cream prn. Colonoscopy if does not clear. Plan: 1. Remember to measure portions, continue low carbohydrate diet 2. Tolerated transition to solids without issue 3. Remember vitamin supplements. 4. Exercise regimen appears adequate. 5. Attend support group 6. Follow-up in 2 month(s). 7. Total time spent with the patient 20 minutes.

## 2023-06-15 ENCOUNTER — HOSPITAL ENCOUNTER (OUTPATIENT)
Facility: HOSPITAL | Age: 38
Discharge: HOME | End: 2023-06-18

## 2023-06-15 DIAGNOSIS — K90.9 INTESTINAL MALABSORPTION, UNSPECIFIED TYPE: Primary | ICD-10-CM

## 2023-06-15 DIAGNOSIS — Z98.84 S/P LAPAROSCOPIC SLEEVE GASTRECTOMY: ICD-10-CM

## 2023-06-15 DIAGNOSIS — E55.9 HYPOVITAMINOSIS D: ICD-10-CM

## 2023-06-15 LAB — LABCORP SPECIMEN COLLECTION: NORMAL

## 2023-06-16 ENCOUNTER — HOSPITAL ENCOUNTER (OUTPATIENT)
Facility: HOSPITAL | Age: 38
Discharge: HOME | End: 2023-06-19

## 2023-06-16 LAB
25(OH)D3+25(OH)D2 SERPL-MCNC: 22.3 NG/ML (ref 30–100)
ALBUMIN SERPL-MCNC: 4.3 G/DL (ref 3.8–4.8)
ALBUMIN/GLOB SERPL: 1.7 {RATIO} (ref 1.2–2.2)
ALP SERPL-CCNC: 59 IU/L (ref 44–121)
ALT SERPL-CCNC: 16 IU/L (ref 0–32)
AST SERPL-CCNC: 14 IU/L (ref 0–40)
BASOPHILS # BLD AUTO: 0 X10E3/UL (ref 0–0.2)
BASOPHILS NFR BLD AUTO: 0 %
BILIRUB SERPL-MCNC: 0.2 MG/DL (ref 0–1.2)
BUN SERPL-MCNC: 11 MG/DL (ref 6–20)
BUN/CREAT SERPL: 19 (ref 9–23)
CALCIUM SERPL-MCNC: 8.7 MG/DL (ref 8.7–10.2)
CHLORIDE SERPL-SCNC: 105 MMOL/L (ref 96–106)
CO2 SERPL-SCNC: 20 MMOL/L (ref 20–29)
CREAT SERPL-MCNC: 0.57 MG/DL (ref 0.57–1)
EGFRCR SERPLBLD CKD-EPI 2021: 118 ML/MIN/1.73
EOSINOPHIL # BLD AUTO: 0.1 X10E3/UL (ref 0–0.4)
EOSINOPHIL NFR BLD AUTO: 1 %
ERYTHROCYTE [DISTWIDTH] IN BLOOD BY AUTOMATED COUNT: 12.1 % (ref 11.7–15.4)
FERRITIN SERPL-MCNC: 46 NG/ML (ref 15–150)
FOLATE SERPL-MCNC: 5.7 NG/ML
GLOBULIN SER CALC-MCNC: 2.6 G/DL (ref 1.5–4.5)
GLUCOSE SERPL-MCNC: 86 MG/DL (ref 70–99)
HCT VFR BLD AUTO: 38.2 % (ref 34–46.6)
HGB BLD-MCNC: 12.2 G/DL (ref 11.1–15.9)
IMM GRANULOCYTES # BLD AUTO: 0 X10E3/UL (ref 0–0.1)
IMM GRANULOCYTES NFR BLD AUTO: 0 %
IRON SERPL-MCNC: 42 UG/DL (ref 27–159)
LABCORP SPECIMEN COLLECTION: NORMAL
LYMPHOCYTES # BLD AUTO: 3.3 X10E3/UL (ref 0.7–3.1)
LYMPHOCYTES NFR BLD AUTO: 36 %
MCH RBC QN AUTO: 28.2 PG (ref 26.6–33)
MCHC RBC AUTO-ENTMCNC: 31.9 G/DL (ref 31.5–35.7)
MCV RBC AUTO: 88 FL (ref 79–97)
MONOCYTES # BLD AUTO: 0.6 X10E3/UL (ref 0.1–0.9)
MONOCYTES NFR BLD AUTO: 6 %
NEUTROPHILS # BLD AUTO: 5.2 X10E3/UL (ref 1.4–7)
NEUTROPHILS NFR BLD AUTO: 57 %
PLATELET # BLD AUTO: 325 X10E3/UL (ref 150–450)
POTASSIUM SERPL-SCNC: 4 MMOL/L (ref 3.5–5.2)
PROT SERPL-MCNC: 6.9 G/DL (ref 6–8.5)
RBC # BLD AUTO: 4.33 X10E6/UL (ref 3.77–5.28)
SODIUM SERPL-SCNC: 140 MMOL/L (ref 134–144)
SPECIMEN STATUS REPORT: NORMAL
VIT B12 SERPL-MCNC: 694 PG/ML (ref 232–1245)
WBC # BLD AUTO: 9.2 X10E3/UL (ref 3.4–10.8)

## 2023-07-14 ENCOUNTER — OFFICE VISIT (OUTPATIENT)
Age: 38
End: 2023-07-14
Payer: MEDICAID

## 2023-07-14 VITALS
HEART RATE: 80 BPM | BODY MASS INDEX: 31.22 KG/M2 | OXYGEN SATURATION: 100 % | DIASTOLIC BLOOD PRESSURE: 72 MMHG | HEIGHT: 65 IN | TEMPERATURE: 97.5 F | SYSTOLIC BLOOD PRESSURE: 109 MMHG | WEIGHT: 187.4 LBS

## 2023-07-14 DIAGNOSIS — Z98.84 S/P LAPAROSCOPIC SLEEVE GASTRECTOMY: ICD-10-CM

## 2023-07-14 DIAGNOSIS — K90.9 INTESTINAL MALABSORPTION, UNSPECIFIED TYPE: Primary | ICD-10-CM

## 2023-07-14 DIAGNOSIS — E55.9 HYPOVITAMINOSIS D: ICD-10-CM

## 2023-07-14 PROCEDURE — 99205 OFFICE O/P NEW HI 60 MIN: CPT | Performed by: NURSE PRACTITIONER

## 2023-07-14 RX ORDER — CYANOCOBALAMIN 1000 UG/ML
INJECTION, SOLUTION INTRAMUSCULAR; SUBCUTANEOUS
Qty: 1 ML | Refills: 11 | Status: SHIPPED | OUTPATIENT
Start: 2023-07-14

## 2023-07-14 RX ORDER — ERGOCALCIFEROL 1.25 MG/1
50000 CAPSULE ORAL WEEKLY
Qty: 26 CAPSULE | Refills: 1 | Status: SHIPPED | OUTPATIENT
Start: 2023-07-14

## 2023-07-14 RX ORDER — SYRINGE W-NEEDLE,DISPOSAB,3 ML 25GX5/8"
SYRINGE, EMPTY DISPOSABLE MISCELLANEOUS
Qty: 15 EACH | Refills: 0 | Status: SHIPPED | OUTPATIENT
Start: 2023-07-14

## 2023-07-14 NOTE — PATIENT INSTRUCTIONS
[employer] or My Fitness Pal Tank  80-90g protein  800-1000 calories   Keep carbs below 50g     Patient Instructions      Remember hydration goals - minimum of 64 ounces of liquids per day (dehydration is the number one reason for hospital readmission). Continue to monitor carbohydrate and protein intake you need a minimum of  Grams of protein daily- remember to keep your total carbohydrates to 50 grams or less per day for best results. Continue to work towards exercise goals - 60-90 minutes, 5 times a week minimum of deliberate, aerobic exercise is the ultimate goal with strength training 2 times each week. Refer to [employer] for  information. Remember to take vitamins as directed. Attend support group the 2nd Thursday of each month. 6.  Constipation: Milk of Magnesia is for immediate relief only. Miralax is to be used every day if constipation is a chronic problem. 7.  Diarrhea: patients will occasionally develop lactose intolerance after surgery. Check to see if your protein shake has whey in it. If it does try a protein powder or drink that does not have whey and stop all yogurts, cheeses and milks to see if the diarrhea goes away. 8.  If you have had labs drawn. We will only call you if you have abnormal results. Otherwise you can access the lab results in \"mychart\". You will only need the access code the first time you sign on. 9.  Call us at [phone] or email us through [employer]" with questions,     concerns or worsening of condition, we have someone on call 24 hours a day. If you are unable to reach our office, you are to go to your Primary Care Physician or the Emergency Department.      NOTE TO GASTRIC BYPASS PATIENTS:  (SAME APPLIES TO GASTRIC SLEEVE PATIENTS FOR FIRST TWO MONTHS)  Remember that for the rest of your life, you are not able to take the following:  - NSAIDs (ibuprofen, goody powder, BC powder, Motrin, Advil, Mobic, Voltaren, Excedrin,

## 2023-11-14 ENCOUNTER — HOSPITAL ENCOUNTER (OUTPATIENT)
Facility: HOSPITAL | Age: 38
Discharge: HOME | End: 2023-11-17
Attending: ORTHOPAEDIC SURGERY
Payer: COMMERCIAL

## 2023-11-14 DIAGNOSIS — M25.562 LEFT KNEE PAIN, UNSPECIFIED CHRONICITY: ICD-10-CM

## 2023-11-14 PROCEDURE — 73721 MRI JNT OF LWR EXTRE W/O DYE: CPT

## 2024-09-23 DIAGNOSIS — E55.9 HYPOVITAMINOSIS D: ICD-10-CM

## 2024-09-23 DIAGNOSIS — E53.9 VITAMIN B DEFICIENCY: ICD-10-CM

## 2024-09-23 DIAGNOSIS — Z98.84 S/P LAPAROSCOPIC SLEEVE GASTRECTOMY: ICD-10-CM

## 2024-09-23 DIAGNOSIS — K90.9 INTESTINAL MALABSORPTION, UNSPECIFIED TYPE: Primary | ICD-10-CM

## 2024-09-23 RX ORDER — CYANOCOBALAMIN 1000 UG/ML
INJECTION, SOLUTION INTRAMUSCULAR; SUBCUTANEOUS
Qty: 1 ML | Refills: 11 | Status: SHIPPED | OUTPATIENT
Start: 2024-09-23

## 2024-11-07 ENCOUNTER — CLINICAL DOCUMENTATION (OUTPATIENT)
Facility: HOSPITAL | Age: 39
End: 2024-11-07

## 2025-07-11 ENCOUNTER — HOSPITAL ENCOUNTER (OUTPATIENT)
Facility: HOSPITAL | Age: 40
Discharge: HOME | End: 2025-07-14
Payer: COMMERCIAL

## 2025-07-11 DIAGNOSIS — E53.9 VITAMIN B DEFICIENCY: ICD-10-CM

## 2025-07-11 DIAGNOSIS — E55.9 HYPOVITAMINOSIS D: ICD-10-CM

## 2025-07-11 DIAGNOSIS — Z98.84 S/P LAPAROSCOPIC SLEEVE GASTRECTOMY: ICD-10-CM

## 2025-07-11 DIAGNOSIS — K90.9 INTESTINAL MALABSORPTION, UNSPECIFIED TYPE: ICD-10-CM

## 2025-07-11 LAB
25(OH)D3 SERPL-MCNC: 39.9 NG/ML (ref 30–100)
ALBUMIN SERPL-MCNC: 3.8 G/DL (ref 3.4–5)
ALBUMIN/GLOB SERPL: 1.1 (ref 0.8–1.7)
ALP SERPL-CCNC: 55 U/L (ref 45–117)
ALT SERPL-CCNC: 34 U/L (ref 10–35)
ANION GAP SERPL CALC-SCNC: 10 MMOL/L (ref 3–18)
AST SERPL-CCNC: 46 U/L (ref 10–38)
BASOPHILS # BLD: 0.04 K/UL (ref 0–0.1)
BASOPHILS NFR BLD: 0.6 % (ref 0–2)
BILIRUB SERPL-MCNC: 0.9 MG/DL (ref 0.2–1)
BUN SERPL-MCNC: 14 MG/DL (ref 6–23)
BUN/CREAT SERPL: 20 (ref 12–20)
CALCIUM SERPL-MCNC: 9.8 MG/DL (ref 8.5–10.1)
CHLORIDE SERPL-SCNC: 104 MMOL/L (ref 98–107)
CO2 SERPL-SCNC: 26 MMOL/L (ref 21–32)
CREAT SERPL-MCNC: 0.71 MG/DL (ref 0.6–1.3)
DIFFERENTIAL METHOD BLD: NORMAL
EOSINOPHIL # BLD: 0.06 K/UL (ref 0–0.4)
EOSINOPHIL NFR BLD: 0.9 % (ref 0–5)
ERYTHROCYTE [DISTWIDTH] IN BLOOD BY AUTOMATED COUNT: 12.4 % (ref 11.6–14.5)
FERRITIN SERPL-MCNC: 44 NG/ML (ref 13–400)
FOLATE SERPL-MCNC: >20 NG/ML (ref 4.6–34.8)
GLOBULIN SER CALC-MCNC: 3.4 G/DL (ref 2–4)
GLUCOSE SERPL-MCNC: 97 MG/DL (ref 74–108)
HCT VFR BLD AUTO: 38.4 % (ref 35–45)
HGB BLD-MCNC: 12.7 G/DL (ref 12–16)
IMM GRANULOCYTES # BLD AUTO: 0.02 K/UL (ref 0–0.04)
IMM GRANULOCYTES NFR BLD AUTO: 0.3 % (ref 0–0.5)
IRON SERPL-MCNC: 209 UG/DL (ref 50–175)
LYMPHOCYTES # BLD: 2.31 K/UL (ref 0.9–3.3)
LYMPHOCYTES NFR BLD: 35.8 % (ref 21–52)
MCH RBC QN AUTO: 29.3 PG (ref 24–34)
MCHC RBC AUTO-ENTMCNC: 33.1 G/DL (ref 31–37)
MCV RBC AUTO: 88.5 FL (ref 78–100)
MONOCYTES # BLD: 0.37 K/UL (ref 0.05–1.2)
MONOCYTES NFR BLD: 5.7 % (ref 3–10)
NEUTS SEG # BLD: 3.66 K/UL (ref 1.8–8)
NEUTS SEG NFR BLD: 56.7 % (ref 40–73)
NRBC # BLD: 0 K/UL (ref 0–0.01)
NRBC BLD-RTO: 0 PER 100 WBC
PLATELET # BLD AUTO: 256 K/UL (ref 135–420)
PMV BLD AUTO: 10.3 FL (ref 9.2–11.8)
POTASSIUM SERPL-SCNC: 4.4 MMOL/L (ref 3.5–5.5)
PROT SERPL-MCNC: 7.2 G/DL (ref 6.4–8.2)
RBC # BLD AUTO: 4.34 M/UL (ref 4.2–5.3)
SODIUM SERPL-SCNC: 140 MMOL/L (ref 136–145)
VIT B12 SERPL-MCNC: 751 PG/ML (ref 211–911)
WBC # BLD AUTO: 6.5 K/UL (ref 4.6–13.2)

## 2025-07-11 PROCEDURE — 82746 ASSAY OF FOLIC ACID SERUM: CPT

## 2025-07-11 PROCEDURE — 82607 VITAMIN B-12: CPT

## 2025-07-11 PROCEDURE — 36415 COLL VENOUS BLD VENIPUNCTURE: CPT

## 2025-07-11 PROCEDURE — 82306 VITAMIN D 25 HYDROXY: CPT

## 2025-07-11 PROCEDURE — 80053 COMPREHEN METABOLIC PANEL: CPT

## 2025-07-11 PROCEDURE — 83540 ASSAY OF IRON: CPT

## 2025-07-11 PROCEDURE — 82728 ASSAY OF FERRITIN: CPT

## 2025-07-11 PROCEDURE — 85025 COMPLETE CBC W/AUTO DIFF WBC: CPT

## 2025-07-11 PROCEDURE — 84425 ASSAY OF VITAMIN B-1: CPT

## 2025-07-15 LAB — VIT B1 BLD-SCNC: 109.1 NMOL/L (ref 66.5–200)

## 2025-07-16 ENCOUNTER — OFFICE VISIT (OUTPATIENT)
Age: 40
End: 2025-07-16
Payer: COMMERCIAL

## 2025-07-16 VITALS
DIASTOLIC BLOOD PRESSURE: 68 MMHG | BODY MASS INDEX: 32.01 KG/M2 | OXYGEN SATURATION: 100 % | TEMPERATURE: 97.3 F | HEART RATE: 78 BPM | SYSTOLIC BLOOD PRESSURE: 120 MMHG | HEIGHT: 65 IN | WEIGHT: 192.1 LBS

## 2025-07-16 DIAGNOSIS — E66.9 OBESITY (BMI 30-39.9): ICD-10-CM

## 2025-07-16 DIAGNOSIS — E83.19 IRON OVERLOAD: ICD-10-CM

## 2025-07-16 DIAGNOSIS — K90.9 INTESTINAL MALABSORPTION, UNSPECIFIED TYPE: Primary | ICD-10-CM

## 2025-07-16 DIAGNOSIS — M51.369 DEGENERATION OF INTERVERTEBRAL DISC OF LUMBAR REGION, UNSPECIFIED WHETHER PAIN PRESENT: ICD-10-CM

## 2025-07-16 DIAGNOSIS — Z98.84 S/P LAPAROSCOPIC SLEEVE GASTRECTOMY: ICD-10-CM

## 2025-07-16 PROCEDURE — 99214 OFFICE O/P EST MOD 30 MIN: CPT | Performed by: NURSE PRACTITIONER

## 2025-07-16 NOTE — PATIENT INSTRUCTIONS
Reset:  3 days liquid diet (hospital discharge diet)  3 days pureed protein (weeks 3-4 in book)  Then stay in months 1-6    [employer] or My Fitness Pal Tank  80-90g protein  800-1000 calories   Keep total carbs below 50g    Our code to connect on [employer] is:   [id number]    Steps for pts to connect to tank:  If the patient does not already have the [employer] tank on their phone, they will need to go to the Tank Store ([employer]) or Play Store (android) and download the Viblio tank to their phone.  They will sign up in the tank and then enter the code above.    If the patient already has the Viblio tank on their phone, they will need to click on the main menu and then click on Connect to My Program. They then enter the code above and click Join.      Patient Instructions      Remember hydration goals - minimum of 64 ounces of liquids per day (dehydration is the number one reason for hospital readmission).  Continue to monitor carbohydrate and protein intake you need a minimum of  Grams of protein daily- remember to keep your total carbohydrates to 50 grams or less per day for best results.  Continue to work towards exercise goals - 60-90 minutes, 5 times a week minimum of deliberate, aerobic exercise is the ultimate goal with strength training 2 times each week. Refer to [employer] for  information.  Remember to take vitamins as directed.  Attend support group the 2nd Thursday of each month.  6.  Constipation: Milk of Magnesia is for immediate relief only.  Miralax is to be used every day if constipation is a chronic problem.    7.  Diarrhea: patients will occasionally develop lactose intolerance after surgery.  Check to see if your protein shake has whey in it.  If it does try a protein powder or drink that does not have whey and stop all yogurts, cheeses and milks to see if the diarrhea goes away.    8.  If you have had labs drawn.  We will only call you if you have abnormal results.

## (undated) DEVICE — SUTURE ETHLN SZ 3-0 L30IN NONABSORBABLE BLK FSL L30MM 3/8 1671H

## (undated) DEVICE — STRAP,POSITIONING,KNEE/BODY,FOAM,4X60": Brand: MEDLINE

## (undated) DEVICE — DEVON™ KNEE AND BODY STRAP 60" X 3" (1.5 M X 7.6 CM): Brand: DEVON

## (undated) DEVICE — KENDALL SCD EXPRESS SLEEVES, KNEE LENGTH, MEDIUM: Brand: KENDALL SCD

## (undated) DEVICE — TIP APPL L35CM RIG FOR SEAL EVICEL

## (undated) DEVICE — VISIGI 3D®  CALIBRATION SYSTEM  SIZE 36FR STD W/ BULB: Brand: BOEHRINGER® VISIGI 3D™ SLEEVE GASTRECTOMY CALIBRATION SYSTEM, SIZE 36FR W/BULB

## (undated) DEVICE — SOLUTION LACTATED RINGERS INJECTION USP

## (undated) DEVICE — STERILE POLYISOPRENE POWDER-FREE SURGICAL GLOVES: Brand: PROTEXIS

## (undated) DEVICE — TROCAR ENDOSCP L100MM DIA12MM STBL SL BLDELSS ENDOPATH XCEL

## (undated) DEVICE — AMD ANTIMICROBIAL DRAIN SPONGES, 6 PLY, 0.2% POLYHEXAMETHYLENE BIGUANIDE HCI (PHMB): Brand: EXCILON

## (undated) DEVICE — GOWN ISOLATN REG BLU POLY UNISX W/ THMB LOOP

## (undated) DEVICE — PREP SKN PREVAIL 40ML APPL --

## (undated) DEVICE — RELOAD STPL H4.1X2MM DIA60MM THCK TISS GRN 6 ROW PWR GST B

## (undated) DEVICE — MEDI-VAC NON-CONDUCTIVE SUCTION TUBING: Brand: CARDINAL HEALTH

## (undated) DEVICE — 4-PORT MANIFOLD: Brand: NEPTUNE 2

## (undated) DEVICE — MAJ-1414 SINGLE USE ADPATER BIOPSY VALV: Brand: SINGLE USE ADAPTOR BIOPSY VALVE

## (undated) DEVICE — STAPLER SKIN L440MM 32MM LNG 12 FIRING B FRM PWR + GRIPPING

## (undated) DEVICE — SLEEVE TRCR L100MM DIA5MM UNIV STBL FOR BLDELSS DIL TIP

## (undated) DEVICE — SUTURE ETHIB EXCL BR GRN TAPR PT 2-0 30 X563H X563H

## (undated) DEVICE — Device

## (undated) DEVICE — FORCEPS BX OVL CUP SERR DISP CAP L 240CM RAD JAW 4

## (undated) DEVICE — SHEAR HARMONIC 5MMX45CM -- ACE 7+

## (undated) DEVICE — SUT MONOCRYL PLUS UD 4-0 --

## (undated) DEVICE — SUTURE PDS II SZ 0 L27IN ABSRB VLT L26MM CT-2 1/2 CIR Z334H

## (undated) DEVICE — RELOAD STPL L60MM H1.5-3.6MM REG TISS BLU GRIPPING SURF B

## (undated) DEVICE — SYR IRR CATH TIP LR ADPT 70ML -- CONVERT TO ITEM 363120

## (undated) DEVICE — APPLIER CLP L SHFT DIA12MM 20 ROT MULT LIGACLP

## (undated) DEVICE — SOL IRRIGATION INJ NACL 0.9% 500ML BTL

## (undated) DEVICE — DRAIN SURG 15FR L3/16IN SIL RND 3/4 FLUT 3/16IN TRCR

## (undated) DEVICE — TROCAR LAP L100MM DIA5MM BLDELSS W/ STBL SL ENDOPATH XCEL

## (undated) DEVICE — SEALANT HEMSTAT 5ML HUM FIBRIN THROM 2 VI APPL DEV EVICEL

## (undated) DEVICE — TROCAR ENDOSCP BLDELSS 12X100 MM W/ HNDL STBL SL OPT TIP

## (undated) DEVICE — ENDO CARRY-ON PROCEDURE KIT INCLUDES ENZYMATIC SPONGE, GAUZE, BIOHAZARD LABEL, TRAY, LUBRICANT, DIRTY SCOPE LABEL, WATER LABEL, TRAY, DRAWSTRING PAD, AND DEFENDO 4-PIECE KIT.: Brand: ENDO CARRY-ON PROCEDURE KIT

## (undated) DEVICE — NEEDLE INSUF L150MM DIA2MM DISP FOR PNEUMOPERI ENDOPATH

## (undated) DEVICE — TROCAR ENDOSCP L100MM DIA15MM BLDELSS STBL SL ENDOPATH XCEL

## (undated) DEVICE — BARIATRIC: Brand: MEDLINE INDUSTRIES, INC.